# Patient Record
Sex: FEMALE | Race: WHITE | Employment: UNEMPLOYED | URBAN - METROPOLITAN AREA
[De-identification: names, ages, dates, MRNs, and addresses within clinical notes are randomized per-mention and may not be internally consistent; named-entity substitution may affect disease eponyms.]

---

## 2024-07-08 ENCOUNTER — TELEPHONE (OUTPATIENT)
Age: 29
End: 2024-07-08

## 2024-07-08 NOTE — TELEPHONE ENCOUNTER
Patients mother call once again and asked if Dr Reyes would still treat her for POTS. I advised her that she should find another provider outside of [facility] for her treatment

## 2024-07-24 ENCOUNTER — TELEPHONE (OUTPATIENT)
Age: 29
End: 2024-07-24

## 2024-07-30 ENCOUNTER — TELEPHONE (OUTPATIENT)
Age: 29
End: 2024-07-30

## 2024-07-30 NOTE — TELEPHONE ENCOUNTER
----- Message from Mandy Pinedo sent at 7/29/2024  6:59 PM EDT -----  Regarding: Ok Hydration  Contact: [phone]  I received a message from Virginie saying everything was sent where everything was suppose to go.   But pharmacy is telling me I need a PA for Amnvig and I think Naratriptan. They said they sent asking y’all to do this.  Can you please to that for me.  Thank you so much!  Mandy Pinedo

## 2024-07-31 NOTE — TELEPHONE ENCOUNTER
RE:Naratriptan HCl 2.5MG tablets   Key: [id number]         Submitted and returned with the following message:Available without authorization           RE:Aimovig  Key: [id number]           Request added and submitted via CMM, has been approved.        Approvedtoday  PA Case: [id number], Status: Approved, Coverage Starts on: 7/31/2024 12:00:00 AM, Coverage Ends on: 7/31/2025 12:00:00 AM.        Nurse notified

## 2024-07-31 NOTE — TELEPHONE ENCOUNTER
Patient and her mother were just informed 07/31/24  10 minutes ago by the pharmacy Dr. Reyes denied the PA for medication Aimovig.    Requesting to be contacted

## 2024-09-03 ENCOUNTER — OFFICE VISIT (OUTPATIENT)
Age: 29
End: 2024-09-03
Payer: MEDICAID

## 2024-09-03 VITALS
DIASTOLIC BLOOD PRESSURE: 82 MMHG | HEIGHT: 64 IN | SYSTOLIC BLOOD PRESSURE: 114 MMHG | HEART RATE: 123 BPM | OXYGEN SATURATION: 96 % | WEIGHT: 293 LBS | BODY MASS INDEX: 50.02 KG/M2 | RESPIRATION RATE: 20 BRPM

## 2024-09-03 DIAGNOSIS — E28.2 PCOS (POLYCYSTIC OVARIAN SYNDROME): ICD-10-CM

## 2024-09-03 DIAGNOSIS — E28.2 PCOS (POLYCYSTIC OVARIAN SYNDROME): Primary | ICD-10-CM

## 2024-09-03 PROCEDURE — G2211 COMPLEX E/M VISIT ADD ON: HCPCS | Performed by: INTERNAL MEDICINE

## 2024-09-03 PROCEDURE — 99214 OFFICE O/P EST MOD 30 MIN: CPT | Performed by: INTERNAL MEDICINE

## 2024-09-03 RX ORDER — METFORMIN HCL 500 MG
1000 TABLET, EXTENDED RELEASE 24 HR ORAL
Qty: 360 TABLET | Refills: 3 | Status: SHIPPED | OUTPATIENT
Start: 2024-09-03

## 2024-09-03 NOTE — PROGRESS NOTES
Chief Complaint   Patient presents with    Weight Management    Polycystic Ovarian Syndrome     History of Present Illness: Mandy Pinedo is a 28 y.o. female with a past medical history significant for POTS, Crohn's disease, PCOS seen for discussion related to PCOS.    Previously very intolerant to several different OCP due to heavy menses with this.  Is considering having a total abdominal hysterectomy due to inability to control frequency and heaviness of menses.  Has previously had 3 miscarriages, currently discussing options with OB/GYN.  Currently taking metformin 1000 mg twice a day.  Received a prior authorization for Wegovy from PCP but is unable to obtain the for several doses.  There is no history of pancreatitis or heavy alcohol use.  Primary concerns are weight loss, acne, hirsutism.  Cousin does have nonclassical CAH and they are wondering if this is related to her POTS.  17 hydroxyprogesterone was previously normal.    05/03/2024: Took samples for ozempic but insurance would not cover wegovy because she was not in comprehensive nutrition program. Stopped OCP as it caused emotional lability. Attempted to get pregnant more recently- had surgery on vaginal/uterine septum. May be able to have IUD due to this procedure. Fallopian tubes are patent according to pt.    09/03/2024: Had surgical manipulation of vaginal and uterine septum, did require IV antibiotics postoperatively.  Is experiencing significant back pain for which a steroid is planned in the coming days.  Both she and her mother are very suspicious about excess cortisol.  Does have dorsal cervical adiposity.  No recent exogenous steroids.  Is taking norethindrone and metformin 1000 twice a day.  Unable to get GLP-1 agonist covered.    Social Hx: Mother Jeannette    Review of Systems:  See HPI    Physical Examination:  /82   Pulse (!) 123   Resp 20   Ht 1.626 m (5' 4\")   Wt 133.2 kg (293 lb 9.6 oz)   LMP 06/04/2024   SpO2 96%   BMI

## 2024-09-05 ENCOUNTER — APPOINTMENT (OUTPATIENT)
Facility: HOSPITAL | Age: 29
End: 2024-09-05
Payer: MEDICAID

## 2024-09-05 ENCOUNTER — HOSPITAL ENCOUNTER (EMERGENCY)
Facility: HOSPITAL | Age: 29
Discharge: HOME | End: 2024-09-06
Attending: STUDENT IN AN ORGANIZED HEALTH CARE EDUCATION/TRAINING PROGRAM
Payer: MEDICAID

## 2024-09-05 ENCOUNTER — CLINICAL DOCUMENTATION (OUTPATIENT)
Age: 29
End: 2024-09-05

## 2024-09-05 ENCOUNTER — TELEPHONE (OUTPATIENT)
Age: 29
End: 2024-09-05

## 2024-09-05 DIAGNOSIS — N93.9 ABNORMAL UTERINE BLEEDING: Primary | ICD-10-CM

## 2024-09-05 LAB
ABO + RH BLD: NORMAL
ALBUMIN SERPL-MCNC: 3.2 G/DL (ref 3.5–5)
ALBUMIN/GLOB SERPL: 0.7 (ref 1.1–2.2)
ALP SERPL-CCNC: 70 U/L (ref 45–117)
ALT SERPL-CCNC: 35 U/L (ref 12–78)
ANION GAP SERPL CALC-SCNC: 3 MMOL/L (ref 2–12)
AST SERPL-CCNC: 15 U/L (ref 15–37)
BASOPHILS # BLD: 0.1 K/UL (ref 0–0.1)
BASOPHILS NFR BLD: 1 % (ref 0–1)
BILIRUB SERPL-MCNC: 0.2 MG/DL (ref 0.2–1)
BLOOD GROUP ANTIBODIES SERPL: NORMAL
BUN SERPL-MCNC: 12 MG/DL (ref 6–20)
BUN/CREAT SERPL: 12 (ref 12–20)
CALCIUM SERPL-MCNC: 8.8 MG/DL (ref 8.5–10.1)
CHLORIDE SERPL-SCNC: 108 MMOL/L (ref 97–108)
CO2 SERPL-SCNC: 25 MMOL/L (ref 21–32)
COMMENT:: NORMAL
CREAT SERPL-MCNC: 0.97 MG/DL (ref 0.55–1.02)
DIFFERENTIAL METHOD BLD: ABNORMAL
EOSINOPHIL # BLD: 0.7 K/UL (ref 0–0.4)
EOSINOPHIL NFR BLD: 6 % (ref 0–7)
ERYTHROCYTE [DISTWIDTH] IN BLOOD BY AUTOMATED COUNT: 14.7 % (ref 11.5–14.5)
GLOBULIN SER CALC-MCNC: 4.5 G/DL (ref 2–4)
GLUCOSE SERPL-MCNC: 103 MG/DL (ref 65–100)
HCT VFR BLD AUTO: 40.7 % (ref 35–47)
HGB BLD-MCNC: 12.9 G/DL (ref 11.5–16)
IMM GRANULOCYTES # BLD AUTO: 0.1 K/UL (ref 0–0.04)
IMM GRANULOCYTES NFR BLD AUTO: 1 % (ref 0–0.5)
LYMPHOCYTES # BLD: 4.3 K/UL (ref 0.8–3.5)
LYMPHOCYTES NFR BLD: 37 % (ref 12–49)
MCH RBC QN AUTO: 29 PG (ref 26–34)
MCHC RBC AUTO-ENTMCNC: 31.7 G/DL (ref 30–36.5)
MCV RBC AUTO: 91.5 FL (ref 80–99)
MONOCYTES # BLD: 1.2 K/UL (ref 0–1)
MONOCYTES NFR BLD: 10 % (ref 5–13)
NEUTS SEG # BLD: 5.2 K/UL (ref 1.8–8)
NEUTS SEG NFR BLD: 45 % (ref 32–75)
NRBC # BLD: 0 K/UL (ref 0–0.01)
NRBC BLD-RTO: 0 PER 100 WBC
PLATELET # BLD AUTO: 394 K/UL (ref 150–400)
PMV BLD AUTO: 10 FL (ref 8.9–12.9)
POTASSIUM SERPL-SCNC: 4 MMOL/L (ref 3.5–5.1)
PROT SERPL-MCNC: 7.7 G/DL (ref 6.4–8.2)
RBC # BLD AUTO: 4.45 M/UL (ref 3.8–5.2)
SODIUM SERPL-SCNC: 136 MMOL/L (ref 136–145)
SPECIMEN EXP DATE BLD: NORMAL
SPECIMEN HOLD: NORMAL
WBC # BLD AUTO: 11.6 K/UL (ref 3.6–11)

## 2024-09-05 PROCEDURE — 76830 TRANSVAGINAL US NON-OB: CPT

## 2024-09-05 PROCEDURE — 80053 COMPREHEN METABOLIC PANEL: CPT

## 2024-09-05 PROCEDURE — 86901 BLOOD TYPING SEROLOGIC RH(D): CPT

## 2024-09-05 PROCEDURE — 96374 THER/PROPH/DIAG INJ IV PUSH: CPT

## 2024-09-05 PROCEDURE — 86900 BLOOD TYPING SEROLOGIC ABO: CPT

## 2024-09-05 PROCEDURE — 85025 COMPLETE CBC W/AUTO DIFF WBC: CPT

## 2024-09-05 PROCEDURE — 6360000002 HC RX W HCPCS: Performed by: STUDENT IN AN ORGANIZED HEALTH CARE EDUCATION/TRAINING PROGRAM

## 2024-09-05 PROCEDURE — 99284 EMERGENCY DEPT VISIT MOD MDM: CPT

## 2024-09-05 PROCEDURE — 86850 RBC ANTIBODY SCREEN: CPT

## 2024-09-05 PROCEDURE — 36415 COLL VENOUS BLD VENIPUNCTURE: CPT

## 2024-09-05 RX ORDER — MORPHINE SULFATE 4 MG/ML
4 INJECTION, SOLUTION INTRAMUSCULAR; INTRAVENOUS
Status: COMPLETED | OUTPATIENT
Start: 2024-09-05 | End: 2024-09-05

## 2024-09-05 RX ADMIN — MORPHINE SULFATE 4 MG: 4 INJECTION, SOLUTION INTRAMUSCULAR; INTRAVENOUS at 23:28

## 2024-09-05 ASSESSMENT — PAIN DESCRIPTION - DESCRIPTORS
DESCRIPTORS: ACHING
DESCRIPTORS: SHARP

## 2024-09-05 ASSESSMENT — PAIN SCALES - GENERAL
PAINLEVEL_OUTOF10: 9
PAINLEVEL_OUTOF10: 9

## 2024-09-05 ASSESSMENT — PAIN - FUNCTIONAL ASSESSMENT: PAIN_FUNCTIONAL_ASSESSMENT: 0-10

## 2024-09-05 ASSESSMENT — PAIN DESCRIPTION - LOCATION
LOCATION: VAGINA
LOCATION: ABDOMEN

## 2024-09-05 NOTE — PROGRESS NOTES
Prior Authorization form completed and faxed to [facility] [employer] [facility]. Waiting for the determination.

## 2024-09-05 NOTE — TELEPHONE ENCOUNTER
Gris with [facility] called stating they received a prior auth with the name Mandy Pinedo but they have a different last name. Pharmacist asked me to contact the patient and get the correct last name. Called patient whom said her new last name is Gina but she is waiting for and updated insurance card. Informed Pharmacist of patient last name. Gris then asked what the diagnosis code is and I provided E66.01 and Z68.42. Gris thanked me for the information and provided the case reference number below.    Ref #: [id number]  Turn around time for the case is 22 hours and 54 minutes.

## 2024-09-06 ENCOUNTER — CLINICAL DOCUMENTATION (OUTPATIENT)
Age: 29
End: 2024-09-06

## 2024-09-06 ENCOUNTER — TELEPHONE (OUTPATIENT)
Age: 29
End: 2024-09-06

## 2024-09-06 VITALS
SYSTOLIC BLOOD PRESSURE: 122 MMHG | TEMPERATURE: 97.8 F | DIASTOLIC BLOOD PRESSURE: 70 MMHG | BODY MASS INDEX: 50.4 KG/M2 | HEIGHT: 64 IN | OXYGEN SATURATION: 97 % | RESPIRATION RATE: 18 BRPM | HEART RATE: 89 BPM

## 2024-09-06 LAB
APPEARANCE UR: CLEAR
BACTERIA URNS QL MICRO: NEGATIVE /HPF
BILIRUB UR QL: NEGATIVE
COLOR UR: ABNORMAL
EPITH CASTS URNS QL MICRO: ABNORMAL /LPF
GLUCOSE UR STRIP.AUTO-MCNC: NEGATIVE MG/DL
HGB UR QL STRIP: NEGATIVE
HYALINE CASTS URNS QL MICRO: ABNORMAL /LPF (ref 0–2)
KETONES UR QL STRIP.AUTO: NEGATIVE MG/DL
LEUKOCYTE ESTERASE UR QL STRIP.AUTO: NEGATIVE
NITRITE UR QL STRIP.AUTO: NEGATIVE
PH UR STRIP: 7 (ref 5–8)
PROT UR STRIP-MCNC: NEGATIVE MG/DL
RBC #/AREA URNS HPF: ABNORMAL /HPF (ref 0–5)
SP GR UR REFRACTOMETRY: 1.01 (ref 1–1.03)
UROBILINOGEN UR QL STRIP.AUTO: 0.2 EU/DL (ref 0.2–1)
WBC URNS QL MICRO: ABNORMAL /HPF (ref 0–4)

## 2024-09-06 PROCEDURE — 81001 URINALYSIS AUTO W/SCOPE: CPT

## 2024-09-06 NOTE — ED TRIAGE NOTES
Patient to treatment area via wheelchair reporting passing a palm sized blood clot following uterine biopsy done yesterday done by MD Sanchez at [facility]. Pt had uterine/ vaginal septum done in January, had extenive post-op infection which resolved 2 weeks ago. Pt called on-call OB/GYN and was referred here.

## 2024-09-06 NOTE — ED NOTES
Pt unable to provide urine at this time.    PT instructed to call out whenever she is ready to go to the bathroom

## 2024-09-06 NOTE — TELEPHONE ENCOUNTER
Pt returned the call to the office and verified using 2 patient identifiers. Pt was made aware of the information below regarding the denial of the medication and will follow up with Dr. Taylor next week.    Pt voiced understandings and will wait to hear back from the office next week.

## 2024-09-06 NOTE — TELEPHONE ENCOUNTER
Received a denial letter for the Wegovy from [employer]. The letter states \"maybe able to approve the medication in a  certain situation where the patient had tried and failed a certain other drug for 6 month trial of Saxenda without a body weight reduction of 5%) but they did not see that information applied.        Will notify the patient and make Dr. Taylor aware of this information upon returning the office next week to see how she which to proceed.       Pt called but went to voicemail. Left message advising to call the office back.

## 2024-09-06 NOTE — ED PROVIDER NOTES
[facility] EMERGENCY DEPT  EMERGENCY DEPARTMENT ENCOUNTER      Pt Name: Mandy Fuentes  MRN: [id number]  Birthdate 1995  Date of evaluation: 9/5/2024  Provider: Syed Arteaga MD    CHIEF COMPLAINT       Chief Complaint   Patient presents with    Vaginal Bleeding    Post-op Problem         HISTORY OF PRESENT ILLNESS   (Location/Symptom, Timing/Onset, Context/Setting, Quality, Duration, Modifying Factors, Severity)  Note limiting factors.   Patient is a 28-year-old female presenting emergency department with pelvic pain, passing clots and vaginal bleeding.  Patient has a recent complicated medical course where she had a uterine septum resection admitted for pelvic pain not responsive to 3 days of outpatient antibiotics for PID. She was treated with 24 hours of IV antibiotics with improvement in her symptoms and discharged with oral antibiotics. She has GYN follow-up scheduled with [facility] [place].  Yesterday [facility] she started to have lower pelvic pain, discomfort started passing large clots.  Patient shows picture of a clot approximately the size of a softball she has been having chills denies any chest pain, shortness of breath dizziness, lightheadedness.            Review of External Medical Records:     Nursing Notes were reviewed.    REVIEW OF SYSTEMS    (2-9 systems for level 4, 10 or more for level 5)     Review of Systems    Except as noted above the remainder of the review of systems was reviewed and negative.       PAST MEDICAL HISTORY     Past Medical History:   Diagnosis Date    Abnormal Pap smear of cervix     Adiposity 12/29/2017    Adverse effect of anesthesia     woke up during colonoscopy    Adverse effect of anesthesia     difficult IV stick    Allergic rhinitis due to other allergen     Amplified musculoskeletal pain syndrome     RND    Anxiety     Arthritis     spondylar arthritis    Asthma     seasonal    Chronic pain   motion.      Cervical back: Normal range of motion and neck supple.   Skin:     General: Skin is warm and dry.   Neurological:      General: No focal deficit present.      Mental Status: She is alert and oriented to person, place, and time. Mental status is at baseline.   Psychiatric:         Mood and Affect: Mood normal.         Behavior: Behavior normal.         DIAGNOSTIC RESULTS     EKG: All EKG's are interpreted by the Emergency Department Physician who either signs or Co-signs this chart in the absence of a cardiologist.        RADIOLOGY:   Non-plain film images such as CT, Ultrasound and MRI are read by the radiologist. Plain radiographic images are visualized and preliminarily interpreted by the emergency physician with the below findings:        Interpretation per the Radiologist below, if available at the time of this note:    US NON OB TRANSVAGINAL   Final Result   Normal appearance of the uterus and left ovary. Right ovary obscured   by bowel gas.      Electronically signed by Santosh Block           LABS:  Labs Reviewed   URINALYSIS WITH MICROSCOPIC - Abnormal; Notable for the following components:       Result Value    Epithelial Cells, UA MODERATE (*)     All other components within normal limits   CBC WITH AUTO DIFFERENTIAL - Abnormal; Notable for the following components:    WBC 11.6 (*)     RDW 14.7 (*)     Immature Granulocytes % 1 (*)     Lymphocytes Absolute 4.3 (*)     Monocytes Absolute 1.2 (*)     Eosinophils Absolute 0.7 (*)     Immature Granulocytes Absolute 0.1 (*)     All other components within normal limits   COMPREHENSIVE METABOLIC PANEL - Abnormal; Notable for the following components:    Glucose 103 (*)     Albumin 3.2 (*)     Globulin 4.5 (*)     Albumin/Globulin Ratio 0.7 (*)     All other components within normal limits   EXTRA TUBES HOLD   EXTRA TUBE, BLOOD BANK   TYPE AND SCREEN       All other labs were within normal range or not returned as of this dictation.    EMERGENCY

## 2024-09-06 NOTE — PROGRESS NOTES
Received a denial letter for the Wegovy from [employer]. The letter states \"maybe able to approve the medication in a  certain situation where the patient had tried and failed a certain other drug for 6 month trial of Saxenda without a body weight reduction of 5%) but they did not see that information applied.      Will notify the patient and make Dr. Taylor aware of this information upon returning the office next week to see how she which to proceed.

## 2024-09-06 NOTE — ED NOTES
ED SIGN OUT NOTE  Care assumed at [facility] 1:21 AM EDT    Patient was signed out to me by Dr. Arteaga.     Patient is awaiting lab and ultrasound results and reassessment..    /70   Pulse (!) 105   Temp 97.8 °F (36.6 °C) (Oral)   Resp 18   Ht 1.626 m (5' 4\")   LMP 06/04/2024   SpO2 97%   BMI 50.40 kg/m²     Labs Reviewed   URINALYSIS WITH MICROSCOPIC - Abnormal; Notable for the following components:       Result Value    Epithelial Cells, UA MODERATE (*)     All other components within normal limits   CBC WITH AUTO DIFFERENTIAL - Abnormal; Notable for the following components:    WBC 11.6 (*)     RDW 14.7 (*)     Immature Granulocytes % 1 (*)     Lymphocytes Absolute 4.3 (*)     Monocytes Absolute 1.2 (*)     Eosinophils Absolute 0.7 (*)     Immature Granulocytes Absolute 0.1 (*)     All other components within normal limits   COMPREHENSIVE METABOLIC PANEL - Abnormal; Notable for the following components:    Glucose 103 (*)     Albumin 3.2 (*)     Globulin 4.5 (*)     Albumin/Globulin Ratio 0.7 (*)     All other components within normal limits   EXTRA TUBES HOLD   EXTRA TUBE, BLOOD BANK   TYPE AND SCREEN     US NON OB TRANSVAGINAL   Final Result   Normal appearance of the uterus and left ovary. Right ovary obscured   by bowel gas.      Electronically signed by Santosh Block           This is a 28-year-old female patient to the ER for evaluation for vaginal bleeding who is otherwise stable.  The patient had a biopsy of the uterus yesterday by her gynecologist and began bleeding this evening when she had approximately 1 large clot.  She denies any urinary discomfort at this time.   EXAM:  External genitalia normal.  Pelvic exam: cervix normal, ovaries and uterus normal size and cervical motion tenderness without any rebound or guarding, no adnexal masses.  Yellowish discharge.      Progress Note:   Pt has been reexamined by Jerry Cisneros MD. Pt is feeling much better. Symptoms have  improved. All available results have been reviewed with pt and any available family. Pt understands sx, dx, and tx in ED. Care plan has been outlined and questions have been answered. Pt is ready to go home. Will send home on abnormal uterine bleeding instruction.. Outpatient referral with gynecologist this morning for evaluation and further treatment as needed. Written by Jerry Cisneros MD,1:23 AM     Diagnosis:   1. Abnormal uterine bleeding        Disposition:   Decision To Discharge 09/06/2024 12:43:19 AM    Plan:   Discharged home with outpatient referral to gynecologist this morning for reevaluation and further treatment    MD Blayne Duckworth William N, MD  09/06/24 0124

## 2024-09-08 DIAGNOSIS — E66.01 CLASS 3 SEVERE OBESITY DUE TO EXCESS CALORIES WITH SERIOUS COMORBIDITY AND BODY MASS INDEX (BMI) OF 45.0 TO 49.9 IN ADULT (HCC): Primary | ICD-10-CM

## 2024-09-08 RX ORDER — LIRAGLUTIDE 6 MG/ML
INJECTION, SOLUTION SUBCUTANEOUS
Qty: 5 ADJUSTABLE DOSE PRE-FILLED PEN SYRINGE | Refills: 2 | Status: SHIPPED | OUTPATIENT
Start: 2024-09-08

## 2024-09-10 ENCOUNTER — TELEPHONE (OUTPATIENT)
Age: 29
End: 2024-09-10

## 2024-09-11 ENCOUNTER — CLINICAL DOCUMENTATION (OUTPATIENT)
Age: 29
End: 2024-09-11

## 2024-09-12 ENCOUNTER — HOSPITAL ENCOUNTER (OUTPATIENT)
Facility: HOSPITAL | Age: 29
Discharge: HOME | End: 2024-09-12
Attending: PEDIATRICS | Admitting: STUDENT IN AN ORGANIZED HEALTH CARE EDUCATION/TRAINING PROGRAM
Payer: MEDICAID

## 2024-09-12 ENCOUNTER — TELEPHONE (OUTPATIENT)
Age: 29
End: 2024-09-12

## 2024-09-12 VITALS
OXYGEN SATURATION: 99 % | TEMPERATURE: 98 F | SYSTOLIC BLOOD PRESSURE: 148 MMHG | DIASTOLIC BLOOD PRESSURE: 100 MMHG | WEIGHT: 282 LBS | HEIGHT: 64 IN | BODY MASS INDEX: 48.14 KG/M2 | HEART RATE: 101 BPM

## 2024-09-12 DIAGNOSIS — M45.0 ANKYLOSING SPONDYLITIS OF MULTIPLE SITES IN SPINE (HCC): ICD-10-CM

## 2024-09-12 PROCEDURE — 6360000004 HC RX CONTRAST MEDICATION: Performed by: STUDENT IN AN ORGANIZED HEALTH CARE EDUCATION/TRAINING PROGRAM

## 2024-09-12 PROCEDURE — 27096 INJECT SACROILIAC JOINT: CPT

## 2024-09-12 PROCEDURE — 6360000002 HC RX W HCPCS: Performed by: STUDENT IN AN ORGANIZED HEALTH CARE EDUCATION/TRAINING PROGRAM

## 2024-09-12 PROCEDURE — 81025 URINE PREGNANCY TEST: CPT

## 2024-09-12 PROCEDURE — 2500000003 HC RX 250 WO HCPCS: Performed by: STUDENT IN AN ORGANIZED HEALTH CARE EDUCATION/TRAINING PROGRAM

## 2024-09-12 RX ORDER — TRIAMCINOLONE ACETONIDE 40 MG/ML
INJECTION, SUSPENSION INTRA-ARTICULAR; INTRAMUSCULAR PRN
Status: DISCONTINUED | OUTPATIENT
Start: 2024-09-12 | End: 2024-09-12 | Stop reason: HOSPADM

## 2024-09-12 RX ORDER — LIDOCAINE HYDROCHLORIDE 10 MG/ML
INJECTION, SOLUTION EPIDURAL; INFILTRATION; INTRACAUDAL; PERINEURAL PRN
Status: DISCONTINUED | OUTPATIENT
Start: 2024-09-12 | End: 2024-09-12 | Stop reason: HOSPADM

## 2024-09-12 RX ORDER — BUPIVACAINE HYDROCHLORIDE 5 MG/ML
INJECTION, SOLUTION EPIDURAL; INTRACAUDAL PRN
Status: DISCONTINUED | OUTPATIENT
Start: 2024-09-12 | End: 2024-09-12 | Stop reason: HOSPADM

## 2024-09-12 RX ADMIN — BUPIVACAINE HYDROCHLORIDE 4 ML: 5 INJECTION, SOLUTION EPIDURAL; INTRACAUDAL; PERINEURAL at 12:35

## 2024-09-12 RX ADMIN — LIDOCAINE HYDROCHLORIDE 10 ML: 10 INJECTION, SOLUTION EPIDURAL; INFILTRATION; INTRACAUDAL; PERINEURAL at 12:33

## 2024-09-12 RX ADMIN — TRIAMCINOLONE ACETONIDE 80 MG: 40 INJECTION, SUSPENSION INTRA-ARTICULAR; INTRAMUSCULAR at 12:35

## 2024-09-12 RX ADMIN — IOHEXOL 5 ML: 180 INJECTION INTRAVENOUS at 12:34

## 2024-09-12 ASSESSMENT — PAIN DESCRIPTION - LOCATION: LOCATION: BACK

## 2024-09-12 ASSESSMENT — PAIN SCALES - GENERAL: PAINLEVEL_OUTOF10: 8

## 2024-09-12 ASSESSMENT — PAIN DESCRIPTION - DESCRIPTORS: DESCRIPTORS: SHARP

## 2024-09-17 LAB
CORTIS F 24H UR-MCNC: 2 UG/L
CORTIS F 24H UR-MRATE: 12 UG/24 HR (ref 6–42)

## 2024-09-18 ENCOUNTER — APPOINTMENT (OUTPATIENT)
Facility: HOSPITAL | Age: 29
End: 2024-09-18
Payer: MEDICAID

## 2024-09-18 ENCOUNTER — HOSPITAL ENCOUNTER (EMERGENCY)
Facility: HOSPITAL | Age: 29
Discharge: HOME | End: 2024-09-18
Attending: EMERGENCY MEDICINE
Payer: MEDICAID

## 2024-09-18 ENCOUNTER — TELEPHONE (OUTPATIENT)
Age: 29
End: 2024-09-18

## 2024-09-18 VITALS
RESPIRATION RATE: 18 BRPM | BODY MASS INDEX: 49.64 KG/M2 | OXYGEN SATURATION: 98 % | SYSTOLIC BLOOD PRESSURE: 136 MMHG | HEART RATE: 92 BPM | TEMPERATURE: 98 F | WEIGHT: 290.79 LBS | HEIGHT: 64 IN | DIASTOLIC BLOOD PRESSURE: 94 MMHG

## 2024-09-18 DIAGNOSIS — R23.2 FLUSHING: Primary | ICD-10-CM

## 2024-09-18 DIAGNOSIS — R60.0 PEDAL EDEMA: ICD-10-CM

## 2024-09-18 DIAGNOSIS — R50.9 FEVER, UNSPECIFIED FEVER CAUSE: ICD-10-CM

## 2024-09-18 LAB
ALBUMIN SERPL-MCNC: 3.6 G/DL (ref 3.5–5)
ALBUMIN/GLOB SERPL: 0.7 (ref 1.1–2.2)
ALP SERPL-CCNC: 74 U/L (ref 45–117)
ALT SERPL-CCNC: 109 U/L (ref 12–78)
ANION GAP SERPL CALC-SCNC: 9 MMOL/L (ref 2–12)
APPEARANCE UR: CLEAR
AST SERPL-CCNC: 41 U/L (ref 15–37)
BACTERIA URNS QL MICRO: NEGATIVE /HPF
BASOPHILS # BLD: 0.1 K/UL (ref 0–0.1)
BASOPHILS NFR BLD: 1 % (ref 0–1)
BILIRUB SERPL-MCNC: 0.2 MG/DL (ref 0.2–1)
BILIRUB UR QL: NEGATIVE
BUN SERPL-MCNC: 13 MG/DL (ref 6–20)
BUN/CREAT SERPL: 14 (ref 12–20)
CALCIUM SERPL-MCNC: 10.2 MG/DL (ref 8.5–10.1)
CHLORIDE SERPL-SCNC: 102 MMOL/L (ref 97–108)
CO2 SERPL-SCNC: 27 MMOL/L (ref 21–32)
COLOR UR: NORMAL
COMMENT:: NORMAL
CREAT SERPL-MCNC: 0.94 MG/DL (ref 0.55–1.02)
CRP SERPL-MCNC: 1.37 MG/DL (ref 0–0.3)
DIFFERENTIAL METHOD BLD: ABNORMAL
EOSINOPHIL # BLD: 0.2 K/UL (ref 0–0.4)
EOSINOPHIL NFR BLD: 2 % (ref 0–7)
EPITH CASTS URNS QL MICRO: NORMAL /LPF
ERYTHROCYTE [DISTWIDTH] IN BLOOD BY AUTOMATED COUNT: 15.1 % (ref 11.5–14.5)
ERYTHROCYTE [SEDIMENTATION RATE] IN BLOOD: 56 MM/HR (ref 0–20)
GLOBULIN SER CALC-MCNC: 4.9 G/DL (ref 2–4)
GLUCOSE SERPL-MCNC: 88 MG/DL (ref 65–100)
GLUCOSE UR STRIP.AUTO-MCNC: NEGATIVE MG/DL
HCG UR QL: NEGATIVE
HCT VFR BLD AUTO: 41.5 % (ref 35–47)
HGB BLD-MCNC: 13.6 G/DL (ref 11.5–16)
HGB UR QL STRIP: NEGATIVE
IMM GRANULOCYTES # BLD AUTO: 0.2 K/UL (ref 0–0.04)
IMM GRANULOCYTES NFR BLD AUTO: 1 % (ref 0–0.5)
KETONES UR QL STRIP.AUTO: NEGATIVE MG/DL
LEUKOCYTE ESTERASE UR QL STRIP.AUTO: NEGATIVE
LYMPHOCYTES # BLD: 4.8 K/UL (ref 0.8–3.5)
LYMPHOCYTES NFR BLD: 34 % (ref 12–49)
MCH RBC QN AUTO: 28.8 PG (ref 26–34)
MCHC RBC AUTO-ENTMCNC: 32.8 G/DL (ref 30–36.5)
MCV RBC AUTO: 87.7 FL (ref 80–99)
MONOCYTES # BLD: 1.2 K/UL (ref 0–1)
MONOCYTES NFR BLD: 9 % (ref 5–13)
NEUTS SEG # BLD: 7.6 K/UL (ref 1.8–8)
NEUTS SEG NFR BLD: 54 % (ref 32–75)
NITRITE UR QL STRIP.AUTO: NEGATIVE
NRBC # BLD: 0 K/UL (ref 0–0.01)
NRBC BLD-RTO: 0 PER 100 WBC
PH UR STRIP: 6.5 (ref 5–8)
PLATELET # BLD AUTO: 429 K/UL (ref 150–400)
PMV BLD AUTO: 10.4 FL (ref 8.9–12.9)
POTASSIUM SERPL-SCNC: 4.5 MMOL/L (ref 3.5–5.1)
PROT SERPL-MCNC: 8.5 G/DL (ref 6.4–8.2)
PROT UR STRIP-MCNC: NEGATIVE MG/DL
RBC # BLD AUTO: 4.73 M/UL (ref 3.8–5.2)
RBC #/AREA URNS HPF: NORMAL /HPF (ref 0–5)
SODIUM SERPL-SCNC: 138 MMOL/L (ref 136–145)
SP GR UR REFRACTOMETRY: <1.005 (ref 1–1.03)
SPECIMEN HOLD: NORMAL
UROBILINOGEN UR QL STRIP.AUTO: 0.2 EU/DL (ref 0.2–1)
WBC # BLD AUTO: 14.1 K/UL (ref 3.6–11)
WBC URNS QL MICRO: NORMAL /HPF (ref 0–4)

## 2024-09-18 PROCEDURE — 81001 URINALYSIS AUTO W/SCOPE: CPT

## 2024-09-18 PROCEDURE — 80053 COMPREHEN METABOLIC PANEL: CPT

## 2024-09-18 PROCEDURE — 6360000004 HC RX CONTRAST MEDICATION: Performed by: EMERGENCY MEDICINE

## 2024-09-18 PROCEDURE — 96374 THER/PROPH/DIAG INJ IV PUSH: CPT

## 2024-09-18 PROCEDURE — 2580000003 HC RX 258: Performed by: EMERGENCY MEDICINE

## 2024-09-18 PROCEDURE — 99285 EMERGENCY DEPT VISIT HI MDM: CPT

## 2024-09-18 PROCEDURE — 6360000002 HC RX W HCPCS: Performed by: EMERGENCY MEDICINE

## 2024-09-18 PROCEDURE — 85025 COMPLETE CBC W/AUTO DIFF WBC: CPT

## 2024-09-18 PROCEDURE — 85652 RBC SED RATE AUTOMATED: CPT

## 2024-09-18 PROCEDURE — 36415 COLL VENOUS BLD VENIPUNCTURE: CPT

## 2024-09-18 PROCEDURE — 86140 C-REACTIVE PROTEIN: CPT

## 2024-09-18 PROCEDURE — 72193 CT PELVIS W/DYE: CPT

## 2024-09-18 RX ORDER — IOPAMIDOL 755 MG/ML
100 INJECTION, SOLUTION INTRAVASCULAR
Status: COMPLETED | OUTPATIENT
Start: 2024-09-18 | End: 2024-09-18

## 2024-09-18 RX ORDER — 0.9 % SODIUM CHLORIDE 0.9 %
1000 INTRAVENOUS SOLUTION INTRAVENOUS ONCE
Status: COMPLETED | OUTPATIENT
Start: 2024-09-18 | End: 2024-09-18

## 2024-09-18 RX ORDER — TRAMADOL HYDROCHLORIDE 50 MG/1
50 TABLET ORAL EVERY 6 HOURS PRN
COMMUNITY

## 2024-09-18 RX ORDER — KETOROLAC TROMETHAMINE 30 MG/ML
15 INJECTION, SOLUTION INTRAMUSCULAR; INTRAVENOUS ONCE
Status: COMPLETED | OUTPATIENT
Start: 2024-09-18 | End: 2024-09-18

## 2024-09-18 RX ADMIN — IOPAMIDOL 100 ML: 755 INJECTION, SOLUTION INTRAVENOUS at 12:54

## 2024-09-18 RX ADMIN — SODIUM CHLORIDE 1000 ML: 9 INJECTION, SOLUTION INTRAVENOUS at 12:15

## 2024-09-18 RX ADMIN — KETOROLAC TROMETHAMINE 15 MG: 30 INJECTION, SOLUTION INTRAMUSCULAR at 12:39

## 2024-09-18 ASSESSMENT — PAIN DESCRIPTION - ORIENTATION: ORIENTATION: OTHER (COMMENT)

## 2024-09-18 ASSESSMENT — PAIN DESCRIPTION - DESCRIPTORS: DESCRIPTORS: DULL;ACHING

## 2024-09-18 ASSESSMENT — PAIN DESCRIPTION - LOCATION: LOCATION: OTHER (COMMENT)

## 2024-09-18 ASSESSMENT — PAIN SCALES - GENERAL
PAINLEVEL_OUTOF10: 8
PAINLEVEL_OUTOF10: 9
PAINLEVEL_OUTOF10: 9

## 2024-09-18 ASSESSMENT — LIFESTYLE VARIABLES
HOW MANY STANDARD DRINKS CONTAINING ALCOHOL DO YOU HAVE ON A TYPICAL DAY: PATIENT DOES NOT DRINK
HOW OFTEN DO YOU HAVE A DRINK CONTAINING ALCOHOL: NEVER

## 2024-09-21 LAB
CORTIS BS SAL-MCNC: <0.01 UG/DL
CORTIS SP1 P CHAL SAL-MCNC: <0.01 UG/DL
SPECIMEN STATUS REPORT: NORMAL

## 2024-09-24 ENCOUNTER — TELEPHONE (OUTPATIENT)
Age: 29
End: 2024-09-24

## 2024-09-24 NOTE — TELEPHONE ENCOUNTER
Per pt, due to her having excessive thirst and frequent urination, she was asked to have Dr Solis to test her for diabetes insipidus. Pt was made aware that Dr Solis is out of the office and  will not return until 10/7 but I would forward this message for her provider to view when she returns.      She then stated that for the past 7 days,  it appears that her breast are leaking milk and she is not pregnant nor has she been sexually active.      Please advise on what the pt should do or if labs are needed.

## 2024-10-09 RX ORDER — GOLIMUMAB 50 MG/.5ML
INJECTION, SOLUTION SUBCUTANEOUS
Qty: 0.5 ML | Refills: 3 | Status: ACTIVE | OUTPATIENT
Start: 2024-10-09

## 2024-10-09 RX ORDER — CYCLOBENZAPRINE HCL 5 MG
5 TABLET ORAL 2 TIMES DAILY PRN
Qty: 60 TABLET | Refills: 1 | Status: SHIPPED | OUTPATIENT
Start: 2024-10-09 | End: 2024-10-10

## 2024-10-09 NOTE — TELEPHONE ENCOUNTER
Last visit 07/08/24  Lab Results   Component Value Date     09/18/2024    K 4.5 09/18/2024     09/18/2024    CO2 27 09/18/2024    BUN 13 09/18/2024    CREATININE 0.94 09/18/2024    GLUCOSE 88 09/18/2024    CALCIUM 10.2 (H) 09/18/2024    BILITOT 0.2 09/18/2024    ALKPHOS 74 09/18/2024    AST 41 (H) 09/18/2024     (H) 09/18/2024    LABGLOM 86 09/18/2024    GFRAA >60 09/20/2022    AGRATIO 1.4 10/17/2023    GLOB 4.9 (H) 09/18/2024     Lab Results   Component Value Date    WBC 14.1 (H) 09/18/2024    HGB 13.6 09/18/2024    HCT 41.5 09/18/2024    MCV 87.7 09/18/2024     (H) 09/18/2024

## 2024-10-10 ENCOUNTER — OFFICE VISIT (OUTPATIENT)
Age: 29
End: 2024-10-10
Payer: MEDICAID

## 2024-10-10 VITALS
DIASTOLIC BLOOD PRESSURE: 82 MMHG | WEIGHT: 293 LBS | HEIGHT: 65 IN | OXYGEN SATURATION: 97 % | SYSTOLIC BLOOD PRESSURE: 116 MMHG | HEART RATE: 123 BPM | RESPIRATION RATE: 16 BRPM | BODY MASS INDEX: 48.82 KG/M2 | TEMPERATURE: 97.2 F

## 2024-10-10 DIAGNOSIS — G93.2 IIH (IDIOPATHIC INTRACRANIAL HYPERTENSION): ICD-10-CM

## 2024-10-10 DIAGNOSIS — K58.0 IRRITABLE BOWEL SYNDROME WITH DIARRHEA: ICD-10-CM

## 2024-10-10 DIAGNOSIS — Z76.89 ENCOUNTER TO ESTABLISH CARE: Primary | ICD-10-CM

## 2024-10-10 DIAGNOSIS — K21.9 GASTROESOPHAGEAL REFLUX DISEASE WITHOUT ESOPHAGITIS: ICD-10-CM

## 2024-10-10 DIAGNOSIS — K50.119 CROHN'S DISEASE OF COLON WITH COMPLICATION (HCC): ICD-10-CM

## 2024-10-10 DIAGNOSIS — F90.2 ATTENTION DEFICIT HYPERACTIVITY DISORDER (ADHD), COMBINED TYPE: ICD-10-CM

## 2024-10-10 DIAGNOSIS — G90.A POTS (POSTURAL ORTHOSTATIC TACHYCARDIA SYNDROME): ICD-10-CM

## 2024-10-10 DIAGNOSIS — F43.10 PTSD (POST-TRAUMATIC STRESS DISORDER): ICD-10-CM

## 2024-10-10 DIAGNOSIS — F33.41 RECURRENT MAJOR DEPRESSIVE DISORDER, IN PARTIAL REMISSION (HCC): ICD-10-CM

## 2024-10-10 DIAGNOSIS — F41.9 ANXIETY: ICD-10-CM

## 2024-10-10 DIAGNOSIS — R79.89 ABNORMAL LFTS (LIVER FUNCTION TESTS): ICD-10-CM

## 2024-10-10 DIAGNOSIS — L73.9 FOLLICULITIS: ICD-10-CM

## 2024-10-10 DIAGNOSIS — Z23 NEED FOR INFLUENZA VACCINATION: ICD-10-CM

## 2024-10-10 DIAGNOSIS — H61.20 CERUMEN IN AUDITORY CANAL ON EXAMINATION: ICD-10-CM

## 2024-10-10 PROBLEM — K44.9 HIATAL HERNIA: Status: ACTIVE | Noted: 2024-10-10

## 2024-10-10 PROBLEM — L40.9 PSORIASIS: Status: ACTIVE | Noted: 2024-10-10

## 2024-10-10 PROBLEM — F44.5 PSYCHOGENIC NONEPILEPTIC SEIZURE: Status: ACTIVE | Noted: 2024-10-10

## 2024-10-10 PROCEDURE — 90661 CCIIV3 VAC ABX FR 0.5 ML IM: CPT

## 2024-10-10 PROCEDURE — 99214 OFFICE O/P EST MOD 30 MIN: CPT

## 2024-10-10 RX ORDER — GOLIMUMAB 50 MG/.5ML
50 INJECTION, SOLUTION SUBCUTANEOUS
COMMUNITY

## 2024-10-10 RX ORDER — VENLAFAXINE HYDROCHLORIDE 75 MG/1
75 CAPSULE, EXTENDED RELEASE ORAL DAILY
COMMUNITY

## 2024-10-10 RX ORDER — MONTELUKAST SODIUM 4 MG/1
1 TABLET, CHEWABLE ORAL PRN
COMMUNITY

## 2024-10-10 ASSESSMENT — PATIENT HEALTH QUESTIONNAIRE - PHQ9
4. FEELING TIRED OR HAVING LITTLE ENERGY: NEARLY EVERY DAY
1. LITTLE INTEREST OR PLEASURE IN DOING THINGS: NEARLY EVERY DAY
9. THOUGHTS THAT YOU WOULD BE BETTER OFF DEAD, OR OF HURTING YOURSELF: NOT AT ALL
SUM OF ALL RESPONSES TO PHQ QUESTIONS 1-9: 18
SUM OF ALL RESPONSES TO PHQ9 QUESTIONS 1 & 2: 5
7. TROUBLE CONCENTRATING ON THINGS, SUCH AS READING THE NEWSPAPER OR WATCHING TELEVISION: MORE THAN HALF THE DAYS
2. FEELING DOWN, DEPRESSED OR HOPELESS: MORE THAN HALF THE DAYS
SUM OF ALL RESPONSES TO PHQ QUESTIONS 1-9: 18
SUM OF ALL RESPONSES TO PHQ QUESTIONS 1-9: 18
6. FEELING BAD ABOUT YOURSELF - OR THAT YOU ARE A FAILURE OR HAVE LET YOURSELF OR YOUR FAMILY DOWN: NEARLY EVERY DAY
SUM OF ALL RESPONSES TO PHQ QUESTIONS 1-9: 18
8. MOVING OR SPEAKING SO SLOWLY THAT OTHER PEOPLE COULD HAVE NOTICED. OR THE OPPOSITE, BEING SO FIGETY OR RESTLESS THAT YOU HAVE BEEN MOVING AROUND A LOT MORE THAN USUAL: NEARLY EVERY DAY
10. IF YOU CHECKED OFF ANY PROBLEMS, HOW DIFFICULT HAVE THESE PROBLEMS MADE IT FOR YOU TO DO YOUR WORK, TAKE CARE OF THINGS AT HOME, OR GET ALONG WITH OTHER PEOPLE: EXTREMELY DIFFICULT
5. POOR APPETITE OR OVEREATING: NOT AT ALL
3. TROUBLE FALLING OR STAYING ASLEEP: MORE THAN HALF THE DAYS

## 2024-10-10 ASSESSMENT — ENCOUNTER SYMPTOMS
WHEEZING: 0
CONSTIPATION: 0
BACK PAIN: 1
SHORTNESS OF BREATH: 0
SINUS PAIN: 0
ABDOMINAL PAIN: 0
DIARRHEA: 0
SORE THROAT: 0
VOMITING: 0
CHEST TIGHTNESS: 0
NAUSEA: 0
COUGH: 0

## 2024-10-10 NOTE — PROGRESS NOTES
specialist- no acute findings meriting change in the plan         Folliculitis    Reassured that vulvar lesion appears to be secondary to folliculitis. Recommended monitoring for signs of superimposed infection.         Cerumen in auditory canal on examination    Likely explanation for reported eustachian tube dysfunction, recommended Debrox.           Subjective/Objective:   Mandy was seen today for Establish Care (Patient here today as a new patient to establish care.Pt concerned that her ears are stopped up and popping x 2 weeks. Patient has a boil that popped yesterday on her bottom that she would like to get checked. Gogo Ayala [facility] )     Mandy is a 28 year-old woman with a medical history of POTS, Crohn's, PCOS, ankylosing spondylitis, psoriasis c/b psoriatic arthritis, hiatal hernia c/b GERD s/p Nissen fundoplication, IBS-D, migraines, suspected IIH, pseudoseizures, CASE/MDD/PTSD/ADHD, ?somatic symptom disroder (self-reported), and chronic pain related to AS and/or thoracic DJD (follows with multiple specialists including Endo, Cardiology, Neurology, Neuro-Ophtho, Rheumatology, Weight management, Psychiatry, and Neuropsych) who presents to establish care.    Providers:  Cardiology - Dr. Marybeth Plasencia ([facility]) and Dr. Kirt Greene ([facility])  GI - Dr. Claire Wilkerson ([facility])  Rheum - Dr. Jaimee Snider  Endo: Dr. Corina Solis  Neurology: Previously Dr. Delbert Reyes, now Dr. Monroe [facility]  Neuro-Ophtho: Dr. Satish Mcadams ([facility]  Weight Management: Dr. Thuy Taylor  Ob/Gyn: Dr. Ruben Ayala  Psychiatry: Dr. Fletcher Dubois [facility] at [facility]    POTS:   More recent diagnosis within the last couple years. Manifests as frequent lightheadedness and spells of passing out with ambulation, getting up and dressed. Follows with two Cardiologists, Dr. Marybeth Plasencia and Dr. Kirt Greene. Per patient, Dr. Plasencia plans to restart her on IV hydration therapy which she used

## 2024-10-10 NOTE — ASSESSMENT & PLAN NOTE
Monitored by specialist- no acute findings meriting change in the plan. Reports pending LP with Neuro-Ophtho.

## 2024-10-14 ENCOUNTER — TELEPHONE (OUTPATIENT)
Age: 29
End: 2024-10-14

## 2024-10-14 NOTE — TELEPHONE ENCOUNTER
Identified patient with 2 pt identifiers.    Mother called to cancel appt, stated daughter was not feeling well     Will call to reschedule

## 2024-10-15 ENCOUNTER — TELEMEDICINE (OUTPATIENT)
Age: 29
End: 2024-10-15
Payer: MEDICAID

## 2024-10-15 DIAGNOSIS — N64.3 GALACTORRHEA: ICD-10-CM

## 2024-10-15 DIAGNOSIS — N64.3 GALACTORRHEA: Primary | ICD-10-CM

## 2024-10-15 PROCEDURE — G2211 COMPLEX E/M VISIT ADD ON: HCPCS | Performed by: INTERNAL MEDICINE

## 2024-10-15 PROCEDURE — 99214 OFFICE O/P EST MOD 30 MIN: CPT | Performed by: INTERNAL MEDICINE

## 2024-10-18 ENCOUNTER — HOSPITAL ENCOUNTER (OUTPATIENT)
Facility: HOSPITAL | Age: 29
Setting detail: INFUSION SERIES
End: 2024-10-18

## 2024-10-21 ENCOUNTER — TELEPHONE (OUTPATIENT)
Age: 29
End: 2024-10-21

## 2024-10-21 NOTE — TELEPHONE ENCOUNTER
Returned call to patient and verified using 2 patient identifiers. Pt was calling regarding the Saxenda. Pt was informed that the Saxenda was denied by the insurance must have tried and failed oral medications. Pt states that she is not able to take oral medications due to her POTS. Pt was in the hospital so she was not able to keep the September appointment but is scheduled for 11/12.  Pt was advised the information was submitted to them but it was still denied. Pt was advised to schedule a sooner appointment to discuss maybe consider a virtual visit. Pt was informed will make our PSR aware that she will be calling the office back to schedule a virtual appointment.     Pt voiced understandings and will call the office back to get scheduled.

## 2024-10-24 ENCOUNTER — TELEPHONE (OUTPATIENT)
Age: 29
End: 2024-10-24

## 2024-10-31 ENCOUNTER — TELEPHONE (OUTPATIENT)
Age: 29
End: 2024-10-31

## 2024-10-31 LAB
ACTH PLAS-MCNC: 26.6 PG/ML (ref 7.2–63.3)
ALBUMIN SERPL-MCNC: 4.2 G/DL (ref 4–5)
ALP SERPL-CCNC: 98 IU/L (ref 44–121)
ALT SERPL-CCNC: 55 IU/L (ref 0–32)
AST SERPL-CCNC: 37 IU/L (ref 0–40)
BASOPHILS # BLD AUTO: 0.1 X10E3/UL (ref 0–0.2)
BASOPHILS NFR BLD AUTO: 1 %
BILIRUB DIRECT SERPL-MCNC: <0.1 MG/DL (ref 0–0.4)
BILIRUB SERPL-MCNC: <0.2 MG/DL (ref 0–1.2)
BUN SERPL-MCNC: 7 MG/DL (ref 6–20)
BUN/CREAT SERPL: 8 (ref 9–23)
CALCIUM SERPL-MCNC: 9.6 MG/DL (ref 8.7–10.2)
CHLORIDE SERPL-SCNC: 106 MMOL/L (ref 96–106)
CO2 SERPL-SCNC: 20 MMOL/L (ref 20–29)
CREAT SERPL-MCNC: 0.83 MG/DL (ref 0.57–1)
EGFRCR SERPLBLD CKD-EPI 2021: 100 ML/MIN/1.73
EOSINOPHIL # BLD AUTO: 0.2 X10E3/UL (ref 0–0.4)
EOSINOPHIL NFR BLD AUTO: 2 %
ERYTHROCYTE [DISTWIDTH] IN BLOOD BY AUTOMATED COUNT: 12.9 % (ref 11.7–15.4)
ESTRADIOL SERPL-MCNC: 38.7 PG/ML
FSH SERPL-ACNC: 5.4 MIU/ML
GLUCOSE SERPL-MCNC: 97 MG/DL (ref 70–99)
HCT VFR BLD AUTO: 41.4 % (ref 34–46.6)
HGB BLD-MCNC: 12.9 G/DL (ref 11.1–15.9)
IGF-I SERPL-MCNC: 397 NG/ML (ref 91–308)
IMM GRANULOCYTES # BLD AUTO: 0.1 X10E3/UL (ref 0–0.1)
IMM GRANULOCYTES NFR BLD AUTO: 1 %
LH SERPL-ACNC: 11.4 MIU/ML
LYMPHOCYTES # BLD AUTO: 2.8 X10E3/UL (ref 0.7–3.1)
LYMPHOCYTES NFR BLD AUTO: 27 %
MCH RBC QN AUTO: 28.7 PG (ref 26.6–33)
MCHC RBC AUTO-ENTMCNC: 31.2 G/DL (ref 31.5–35.7)
MCV RBC AUTO: 92 FL (ref 79–97)
MONOCYTES # BLD AUTO: 0.9 X10E3/UL (ref 0.1–0.9)
MONOCYTES NFR BLD AUTO: 9 %
NEUTROPHILS # BLD AUTO: 6.2 X10E3/UL (ref 1.4–7)
NEUTROPHILS NFR BLD AUTO: 60 %
PLATELET # BLD AUTO: 374 X10E3/UL (ref 150–450)
POTASSIUM SERPL-SCNC: 4.3 MMOL/L (ref 3.5–5.2)
PROLACTIN SERPL-MCNC: 11.3 NG/ML (ref 4.8–33.4)
PROT SERPL-MCNC: 7.1 G/DL (ref 6–8.5)
RBC # BLD AUTO: 4.5 X10E6/UL (ref 3.77–5.28)
SODIUM SERPL-SCNC: 140 MMOL/L (ref 134–144)
WBC # BLD AUTO: 10.2 X10E3/UL (ref 3.4–10.8)

## 2024-10-31 NOTE — TELEPHONE ENCOUNTER
Called patient in regards to an appointment reminder. Patient did not answer, left voicemail asking patient to call back.

## 2024-11-01 ENCOUNTER — TELEMEDICINE (OUTPATIENT)
Age: 29
End: 2024-11-01
Payer: MEDICAID

## 2024-11-01 VITALS
HEIGHT: 65 IN | DIASTOLIC BLOOD PRESSURE: 67 MMHG | BODY MASS INDEX: 48 KG/M2 | WEIGHT: 288.1 LBS | SYSTOLIC BLOOD PRESSURE: 128 MMHG

## 2024-11-01 DIAGNOSIS — E66.813 CLASS 3 SEVERE OBESITY DUE TO EXCESS CALORIES WITH SERIOUS COMORBIDITY AND BODY MASS INDEX (BMI) OF 45.0 TO 49.9 IN ADULT: Primary | ICD-10-CM

## 2024-11-01 DIAGNOSIS — E66.01 CLASS 3 SEVERE OBESITY DUE TO EXCESS CALORIES WITH SERIOUS COMORBIDITY AND BODY MASS INDEX (BMI) OF 45.0 TO 49.9 IN ADULT: Primary | ICD-10-CM

## 2024-11-01 DIAGNOSIS — G47.8 UNREFRESHED BY SLEEP: ICD-10-CM

## 2024-11-01 DIAGNOSIS — R94.5 ABNORMAL LIVER FUNCTION: ICD-10-CM

## 2024-11-01 DIAGNOSIS — E28.2 PCOS (POLYCYSTIC OVARIAN SYNDROME): ICD-10-CM

## 2024-11-01 DIAGNOSIS — G90.A POTS (POSTURAL ORTHOSTATIC TACHYCARDIA SYNDROME): ICD-10-CM

## 2024-11-01 PROCEDURE — 99214 OFFICE O/P EST MOD 30 MIN: CPT | Performed by: FAMILY MEDICINE

## 2024-11-01 ASSESSMENT — PATIENT HEALTH QUESTIONNAIRE - PHQ9
2. FEELING DOWN, DEPRESSED OR HOPELESS: NOT AT ALL
5. POOR APPETITE OR OVEREATING: NOT AT ALL
3. TROUBLE FALLING OR STAYING ASLEEP: NOT AT ALL
8. MOVING OR SPEAKING SO SLOWLY THAT OTHER PEOPLE COULD HAVE NOTICED. OR THE OPPOSITE, BEING SO FIGETY OR RESTLESS THAT YOU HAVE BEEN MOVING AROUND A LOT MORE THAN USUAL: NOT AT ALL
SUM OF ALL RESPONSES TO PHQ9 QUESTIONS 1 & 2: 0
SUM OF ALL RESPONSES TO PHQ QUESTIONS 1-9: 0
4. FEELING TIRED OR HAVING LITTLE ENERGY: NOT AT ALL
7. TROUBLE CONCENTRATING ON THINGS, SUCH AS READING THE NEWSPAPER OR WATCHING TELEVISION: NOT AT ALL
SUM OF ALL RESPONSES TO PHQ QUESTIONS 1-9: 0
1. LITTLE INTEREST OR PLEASURE IN DOING THINGS: NOT AT ALL
6. FEELING BAD ABOUT YOURSELF - OR THAT YOU ARE A FAILURE OR HAVE LET YOURSELF OR YOUR FAMILY DOWN: NOT AT ALL
9. THOUGHTS THAT YOU WOULD BE BETTER OFF DEAD, OR OF HURTING YOURSELF: NOT AT ALL

## 2024-11-01 NOTE — PROGRESS NOTES
Mandy Fuentes is a 28 y.o. female who was seen by synchronous (real-time) audio-video technology on 11/1/2024.      Consent:  She and/or her healthcare decision maker is aware that this patient-initiated Telehealth encounter is a billable service, with coverage as determined by her insurance carrier. She is aware that she may receive a bill and has provided verbal consent to proceed: Yes    I was at home while conducting this encounter.    Mandy Fuentes is a 28 y.o. female  who is here for her follow up  Evaluation for the medical bariatric care.    Mandy Fuentes, was evaluated through a synchronous (real-time) audio-video encounter. The patient (or guardian if applicable) is aware that this is a billable service, which includes applicable co-pays. This Virtual Visit was conducted with patient's (and/or legal guardian's) consent. Patient identification was verified, and a caregiver was present when appropriate.   The patient was located at Home: [address] [place] [id number]  Provider was located at Home (Appt Dept State): [place]  Confirm you are appropriately licensed, registered, or certified to deliver care in the state where the patient is located as indicated above. If you are not or unsure, please re-schedule the visit: Yes, I confirm.          --Thuy Taylor MD on 11/2/2024 at 4:50 PM    An electronic signature was used to authenticate this note.   CC: I want to be healthier  She was in the hospital recently  She has a hysterectomy planned soon  She has POTS and eats a lot of sodium as treatment which leads to water retention  Also does not exercise much  Insurance denied the wegovy she was told she must try oral meds but she cannot take a stimulant      Weight History  Current weight 288( 289) and BMI is Body mass index is 47.94 kg/m².  Goal weight BMI 29,   Highest weight 289   (See weight gain time line scanned into media section of chart)      Hunger control:

## 2024-11-01 NOTE — PROGRESS NOTES
Identified pt with two pt identifiers (name and ). Reviewed chart in preparation for visit and have obtained necessary documentation.    Mandy Fuentes is a 28 y.o. female  No chief complaint on file.    /67   Ht 1.651 m (5' 5\")   Wt 130.7 kg (288 lb 1.6 oz)   BMI 47.94 kg/m²     1. Have you been to the ER, urgent care clinic since your last visit?  Hospitalized since your last visit?no    2. Have you seen or consulted any other health care providers outside of the [facility] since your last visit?  Include any pap smears or colon screening. no

## 2024-11-05 ENCOUNTER — CLINICAL DOCUMENTATION (OUTPATIENT)
Age: 29
End: 2024-11-05

## 2024-11-05 NOTE — PROGRESS NOTES
[facility] that the appeals must be faxed to the number on the denial letter.     Refaxed the appeal information to [phone] with the confirmation page received. Waiting for the determination.

## 2024-11-12 ENCOUNTER — OFFICE VISIT (OUTPATIENT)
Age: 29
End: 2024-11-12
Payer: MEDICAID

## 2024-11-12 VITALS
OXYGEN SATURATION: 94 % | HEART RATE: 118 BPM | WEIGHT: 293 LBS | DIASTOLIC BLOOD PRESSURE: 91 MMHG | RESPIRATION RATE: 16 BRPM | SYSTOLIC BLOOD PRESSURE: 132 MMHG | BODY MASS INDEX: 49.09 KG/M2 | TEMPERATURE: 97.9 F

## 2024-11-12 DIAGNOSIS — M45.0 ANKYLOSING SPONDYLITIS OF MULTIPLE SITES IN SPINE (HCC): Primary | ICD-10-CM

## 2024-11-12 PROCEDURE — 99215 OFFICE O/P EST HI 40 MIN: CPT | Performed by: PEDIATRICS

## 2024-11-12 RX ORDER — ACETYLCYSTEINE 600 MG
CAPSULE ORAL
COMMUNITY
Start: 2024-11-06

## 2024-11-12 RX ORDER — CYCLOBENZAPRINE HCL 5 MG
5 TABLET ORAL 3 TIMES DAILY PRN
Qty: 90 TABLET | Refills: 0 | Status: SHIPPED | OUTPATIENT
Start: 2024-11-12

## 2024-11-12 RX ORDER — GOLIMUMAB 50 MG/.5ML
50 INJECTION, SOLUTION SUBCUTANEOUS
Qty: 0.5 ML | Refills: 6 | Status: ACTIVE | OUTPATIENT
Start: 2024-11-12

## 2024-11-12 ASSESSMENT — PATIENT HEALTH QUESTIONNAIRE - PHQ9
SUM OF ALL RESPONSES TO PHQ QUESTIONS 1-9: 0
2. FEELING DOWN, DEPRESSED OR HOPELESS: NOT AT ALL
SUM OF ALL RESPONSES TO PHQ9 QUESTIONS 1 & 2: 0
SUM OF ALL RESPONSES TO PHQ QUESTIONS 1-9: 0
SUM OF ALL RESPONSES TO PHQ QUESTIONS 1-9: 0
1. LITTLE INTEREST OR PLEASURE IN DOING THINGS: NOT AT ALL
SUM OF ALL RESPONSES TO PHQ QUESTIONS 1-9: 0

## 2024-11-12 NOTE — PROGRESS NOTES
RHEUMATOLOGY PROBLEM LIST AND CHIEF COMPLAINT  1. Spondyloarthropathy / AS (2013) - Inflammatory bowel disease related arthritis    Therapy History:  Prior DMARDs: Cimzia (9/2015-11/2015), Remicade (2014 - not effective), Humira (2014 - effective but lupus-like syndrome, polyarthritis, double-stranded DNA / BLANCA positive), Orencia (07/2016-08/2016), Leflunomide (5/2018-11/2018, stopped due to AEs), Methotrexate (6/2014-9/2014, restarted 02/2016 - 6/2017; AEs: Headaches, restarted 11/2019-02/2020)  Current DMARDs: Simponi (10/2016-current)    2. Crohn's disease   3. Chronic pain syndrome     INTERVAL HISTORY  This is a 28 y.o.  female.  Today, the patient complains of pain in the joints.  Location: generalized  Severity: 10 on a scale of 0-10   Timing: all day  Duration: Several months  Context/Associated signs and symptoms: The patient continues on Simponi 50 mg q4 weeks. Reviewed MRI pelivs on 7/19/24 which showed mild bilateral sacroiliac joint OA, without MRI evidence of sacroiliitis. She had SI joint steroid injections in September 2024 which did not help. Today, she is complaining of generalized body pain. The pain is the worst in her lower back and pelvis and she describes feeling like \"someone is digging a screwdriver in her lower back.\" Light touch will cause pain. She will get pseudo seizures due to pain. Her mom states that Mandy is in so much pain that she has to help her do activity like eat and get dressed. She has not left the house other than doctor's appointments due to pain.     She continues to have episodes of vision loss. This will occur 2-3x per week. She has been undergoing workup with neuro ophthalmology for this.     She is scheduled for a hysterectomy next month due to heavy menstrual bleeding and pelvic pain.     RHEUMATOLOGY REVIEW OF SYSTEMS   Positives as per history  Negatives as follows:  CONSTITUTlONAL:  Denies unexplained persistent fevers, weight change, chronic

## 2024-11-12 NOTE — PROGRESS NOTES
Chief Complaint   Patient presents with    Joint Pain     1. Have you been to the ER, urgent care clinic since your last visit?  Hospitalized since your last visit?No    2. Have you seen or consulted any other health care providers outside of the [facility] since your last visit?  Include any pap smears or colon screening. No

## 2024-11-15 ENCOUNTER — HOSPITAL ENCOUNTER (OUTPATIENT)
Facility: HOSPITAL | Age: 29
Discharge: HOME | End: 2024-11-18
Attending: INTERNAL MEDICINE
Payer: MEDICAID

## 2024-11-15 DIAGNOSIS — N64.3 GALACTORRHEA: ICD-10-CM

## 2024-11-15 PROCEDURE — A9575 INJ GADOTERATE MEGLUMI 0.1ML: HCPCS | Performed by: INTERNAL MEDICINE

## 2024-11-15 PROCEDURE — 6360000004 HC RX CONTRAST MEDICATION: Performed by: INTERNAL MEDICINE

## 2024-11-15 PROCEDURE — 70553 MRI BRAIN STEM W/O & W/DYE: CPT | Performed by: INTERNAL MEDICINE

## 2024-11-15 RX ORDER — GADOTERATE MEGLUMINE 376.9 MG/ML
20 INJECTION INTRAVENOUS
Status: COMPLETED | OUTPATIENT
Start: 2024-11-15 | End: 2024-11-15

## 2024-11-15 RX ADMIN — GADOTERATE MEGLUMINE 20 ML: 376.9 INJECTION INTRAVENOUS at 16:55

## 2024-11-30 RX ORDER — ERENUMAB-AOOE 140 MG/ML
INJECTION, SOLUTION SUBCUTANEOUS
Qty: 1 ML | Refills: 0 | Status: SHIPPED | OUTPATIENT
Start: 2024-11-30

## 2024-12-01 DIAGNOSIS — N64.3 GALACTORRHEA: ICD-10-CM

## 2024-12-01 DIAGNOSIS — N64.3 GALACTORRHEA: Primary | ICD-10-CM

## 2024-12-09 ENCOUNTER — OFFICE VISIT (OUTPATIENT)
Age: 29
End: 2024-12-09
Payer: MEDICAID

## 2024-12-09 ENCOUNTER — TELEPHONE (OUTPATIENT)
Age: 29
End: 2024-12-09

## 2024-12-09 VITALS
TEMPERATURE: 97.6 F | RESPIRATION RATE: 16 BRPM | HEART RATE: 121 BPM | DIASTOLIC BLOOD PRESSURE: 91 MMHG | OXYGEN SATURATION: 96 % | BODY MASS INDEX: 48.43 KG/M2 | WEIGHT: 290.7 LBS | HEIGHT: 65 IN | SYSTOLIC BLOOD PRESSURE: 127 MMHG

## 2024-12-09 DIAGNOSIS — E66.813 CLASS 3 SEVERE OBESITY DUE TO EXCESS CALORIES WITH SERIOUS COMORBIDITY AND BODY MASS INDEX (BMI) OF 45.0 TO 49.9 IN ADULT: Primary | ICD-10-CM

## 2024-12-09 DIAGNOSIS — E28.2 PCOS (POLYCYSTIC OVARIAN SYNDROME): ICD-10-CM

## 2024-12-09 DIAGNOSIS — E66.01 CLASS 3 SEVERE OBESITY DUE TO EXCESS CALORIES WITH SERIOUS COMORBIDITY AND BODY MASS INDEX (BMI) OF 45.0 TO 49.9 IN ADULT: Primary | ICD-10-CM

## 2024-12-09 DIAGNOSIS — R94.5 ABNORMAL LIVER FUNCTION: ICD-10-CM

## 2024-12-09 DIAGNOSIS — G93.2 IIH (IDIOPATHIC INTRACRANIAL HYPERTENSION): ICD-10-CM

## 2024-12-09 DIAGNOSIS — G90.A POTS (POSTURAL ORTHOSTATIC TACHYCARDIA SYNDROME): ICD-10-CM

## 2024-12-09 PROCEDURE — 99214 OFFICE O/P EST MOD 30 MIN: CPT | Performed by: FAMILY MEDICINE

## 2024-12-09 RX ORDER — LISDEXAMFETAMINE DIMESYLATE 50 MG/1
50 CAPSULE ORAL DAILY
COMMUNITY
Start: 2024-11-13

## 2024-12-09 RX ORDER — SEMAGLUTIDE 0.25 MG/.5ML
0.25 INJECTION, SOLUTION SUBCUTANEOUS
Qty: 2 ML | Refills: 0 | Status: SHIPPED | OUTPATIENT
Start: 2024-12-09

## 2024-12-09 ASSESSMENT — PATIENT HEALTH QUESTIONNAIRE - PHQ9
4. FEELING TIRED OR HAVING LITTLE ENERGY: NOT AT ALL
SUM OF ALL RESPONSES TO PHQ QUESTIONS 1-9: 0
7. TROUBLE CONCENTRATING ON THINGS, SUCH AS READING THE NEWSPAPER OR WATCHING TELEVISION: NOT AT ALL
8. MOVING OR SPEAKING SO SLOWLY THAT OTHER PEOPLE COULD HAVE NOTICED. OR THE OPPOSITE, BEING SO FIGETY OR RESTLESS THAT YOU HAVE BEEN MOVING AROUND A LOT MORE THAN USUAL: NOT AT ALL
6. FEELING BAD ABOUT YOURSELF - OR THAT YOU ARE A FAILURE OR HAVE LET YOURSELF OR YOUR FAMILY DOWN: NOT AT ALL
1. LITTLE INTEREST OR PLEASURE IN DOING THINGS: NOT AT ALL
SUM OF ALL RESPONSES TO PHQ QUESTIONS 1-9: 0
3. TROUBLE FALLING OR STAYING ASLEEP: NOT AT ALL
9. THOUGHTS THAT YOU WOULD BE BETTER OFF DEAD, OR OF HURTING YOURSELF: NOT AT ALL
SUM OF ALL RESPONSES TO PHQ QUESTIONS 1-9: 0
SUM OF ALL RESPONSES TO PHQ9 QUESTIONS 1 & 2: 0
2. FEELING DOWN, DEPRESSED OR HOPELESS: NOT AT ALL
SUM OF ALL RESPONSES TO PHQ QUESTIONS 1-9: 0
5. POOR APPETITE OR OVEREATING: NOT AT ALL

## 2024-12-09 NOTE — PROGRESS NOTES
Identified pt with two pt identifiers (name and ). Reviewed chart in preparation for visit and have obtained necessary documentation.    Mandy Fuentes is a 28 y.o. female  Chief Complaint   Patient presents with    Weight Management     WLC/ 1 month      BP (!) 127/91 (Site: Left Lower Arm, Position: Sitting, Cuff Size: Large Adult)   Pulse (!) 121   Temp 97.6 °F (36.4 °C) (Oral)   Resp 16   Ht 1.651 m (5' 5\")   Wt 131.9 kg (290 lb 11.2 oz)   LMP  (LMP Unknown)   SpO2 96%   BMI 48.38 kg/m²     1. Have you been to the ER, urgent care clinic since your last visit?  Hospitalized since your last visit?no    2. Have you seen or consulted any other health care providers outside of the [facility] since your last visit?  Include any pap smears or colon screening. No    Patient attended triage but did not bring homework form. Patient instructed to email or fax completed homework form to us. Patient informed that not bringing the homework form can result in not being seen next time.     Patient and provider made aware of elevated BP x2. Patient asymptomatic. Patient reminded to monitor BP, continue to take BP medications if prescribed, and follow up with PCP/Cardiologist.  Patient expressed understanding and agreement.        
taking 1 tablet daily in the morning.      diazePAM (VALIUM) 5 MG tablet Take 1 tablet by mouth 2 times daily.      famotidine (PEPCID) 40 MG tablet Take 1 tablet by mouth nightly      lurasidone (LATUDA) 40 MG TABS tablet Take 1 tablet by mouth Daily with supper      omeprazole (PRILOSEC) 40 MG delayed release capsule Take 1 capsule by mouth daily      venlafaxine (EFFEXOR XR) 75 MG extended release capsule Take 1 capsule by mouth daily      tapentadol (NUCYNTA) 100 MG TABS Take 1 tablet by mouth every 4 hours. (Patient not taking: Reported on 12/9/2024)      liraglutide-weight management (SAXENDA) 18 MG/3ML SOPN 0.6 mg a day for 7 days, then 1.2 mg a day for 7 days, then 1.8 mg a day for 7 days, then 2.4 mg a day for 7 days then 3 mg a day ongoing (Patient not taking: Reported on 9/12/2024) 5 Adjustable Dose Pre-filled Pen Syringe 2    EPINEPHrine (EPIPEN 2-GHASSAN) 0.3 MG/0.3ML SOAJ injection Inject 0.3 mLs into the muscle once for 1 dose Use as directed for allergic reaction 0.3 mL 0     No current facility-administered medications for this visit.       Waist Circumference: See Weight Management Doc Flowsheet  Neck Circumference: See Weight Management Doc Flowsheet  Percent Body Fat: See Weight Management Doc Flowsheet      12/9/2024     1:06 PM 12/9/2024     1:00 PM 11/12/2024     1:36 PM 11/1/2024    10:15 AM 10/10/2024    12:43 PM 9/18/2024    11:00 AM 9/12/2024    12:05 PM   Weight Metrics   Weight 290 lb 11.2 oz  295 lb 288 lb 1.6 oz 294 lb 6.4 oz 290 lb 12.6 oz 282 lb   Neck (Inches)  15.25 in        Waist Measure Inches  47.5 in        Body Fat %  47.9 %        BMI (Calculated) 48.5 kg/m2  0 kg/m2 48 kg/m2 49 kg/m2 50 kg/m2 48.5 kg/m2          No data to display                   Labs:     Review of Systems  Complete ROS negative except where noted above      Physical Exam    Vital Signs Reviewed  Weight Management Metrics Reviewed    Vital Signs Reviewed  Appearance: Obese, A&O x 3, NAD  HEENT:  NC/AT, EOMI,

## 2024-12-09 NOTE — TELEPHONE ENCOUNTER
Patient was in office and requested we call her insurance company to see if a visit with the dietician is covered under her plan. Patient also requested a call back for an update and to be scheduled with Audelia. Patient call back # is [phone].

## 2024-12-09 NOTE — TELEPHONE ENCOUNTER
Called [facility] provider line and spoke with Carole who stated CPT code 81737 is covered at 100% with no copays or coinsurance. Called patient in attempt to schedule a visit with Audelia. No answer, left voicemail advising patient to return call.

## 2024-12-13 ENCOUNTER — CLINICAL DOCUMENTATION (OUTPATIENT)
Age: 29
End: 2024-12-13

## 2024-12-20 ENCOUNTER — HOSPITAL ENCOUNTER (EMERGENCY)
Facility: HOSPITAL | Age: 29
Discharge: HOME | End: 2024-12-20
Attending: EMERGENCY MEDICINE
Payer: MEDICAID

## 2024-12-20 ENCOUNTER — APPOINTMENT (OUTPATIENT)
Facility: HOSPITAL | Age: 29
End: 2024-12-20
Payer: MEDICAID

## 2024-12-20 VITALS
DIASTOLIC BLOOD PRESSURE: 91 MMHG | HEIGHT: 65 IN | WEIGHT: 290 LBS | SYSTOLIC BLOOD PRESSURE: 130 MMHG | OXYGEN SATURATION: 92 % | RESPIRATION RATE: 21 BRPM | TEMPERATURE: 98.1 F | HEART RATE: 88 BPM | BODY MASS INDEX: 48.32 KG/M2

## 2024-12-20 DIAGNOSIS — N30.00 ACUTE CYSTITIS WITHOUT HEMATURIA: Primary | ICD-10-CM

## 2024-12-20 DIAGNOSIS — L03.90 CELLULITIS, UNSPECIFIED CELLULITIS SITE: ICD-10-CM

## 2024-12-20 DIAGNOSIS — R00.2 PALPITATIONS: ICD-10-CM

## 2024-12-20 LAB
ALBUMIN SERPL-MCNC: 3.8 G/DL (ref 3.5–5)
ALBUMIN/GLOB SERPL: 0.8 (ref 1.1–2.2)
ALP SERPL-CCNC: 90 U/L (ref 45–117)
ALT SERPL-CCNC: 89 U/L (ref 12–78)
ANION GAP SERPL CALC-SCNC: 9 MMOL/L (ref 2–12)
APPEARANCE UR: ABNORMAL
AST SERPL-CCNC: 58 U/L (ref 15–37)
BACTERIA URNS QL MICRO: ABNORMAL /HPF
BASOPHILS # BLD: 0.1 K/UL (ref 0–0.1)
BASOPHILS NFR BLD: 1 % (ref 0–1)
BILIRUB SERPL-MCNC: 0.3 MG/DL (ref 0.2–1)
BILIRUB UR QL: NEGATIVE
BUN SERPL-MCNC: 11 MG/DL (ref 6–20)
BUN/CREAT SERPL: 11 (ref 12–20)
CALCIUM SERPL-MCNC: 10.3 MG/DL (ref 8.5–10.1)
CHLORIDE SERPL-SCNC: 101 MMOL/L (ref 97–108)
CLUE CELLS VAG QL WET PREP: NORMAL
CO2 SERPL-SCNC: 27 MMOL/L (ref 21–32)
COLOR UR: ABNORMAL
COMMENT:: NORMAL
CREAT SERPL-MCNC: 0.96 MG/DL (ref 0.55–1.02)
DIFFERENTIAL METHOD BLD: ABNORMAL
EOSINOPHIL # BLD: 0.6 K/UL (ref 0–0.4)
EOSINOPHIL NFR BLD: 5 % (ref 0–7)
EPITH CASTS URNS QL MICRO: ABNORMAL /LPF
ERYTHROCYTE [DISTWIDTH] IN BLOOD BY AUTOMATED COUNT: 13.7 % (ref 11.5–14.5)
GLOBULIN SER CALC-MCNC: 4.9 G/DL (ref 2–4)
GLUCOSE SERPL-MCNC: 88 MG/DL (ref 65–100)
GLUCOSE UR STRIP.AUTO-MCNC: NEGATIVE MG/DL
HCT VFR BLD AUTO: 41.4 % (ref 35–47)
HGB BLD-MCNC: 13.7 G/DL (ref 11.5–16)
HGB UR QL STRIP: ABNORMAL
IMM GRANULOCYTES # BLD AUTO: 0.1 K/UL (ref 0–0.04)
IMM GRANULOCYTES NFR BLD AUTO: 1 % (ref 0–0.5)
KETONES UR QL STRIP.AUTO: NEGATIVE MG/DL
LACTATE SERPL-SCNC: 1.1 MMOL/L (ref 0.4–2)
LEUKOCYTE ESTERASE UR QL STRIP.AUTO: ABNORMAL
LYMPHOCYTES # BLD: 3.6 K/UL (ref 0.8–3.5)
LYMPHOCYTES NFR BLD: 32 % (ref 12–49)
MAGNESIUM SERPL-MCNC: 1.7 MG/DL (ref 1.6–2.4)
MCH RBC QN AUTO: 28.6 PG (ref 26–34)
MCHC RBC AUTO-ENTMCNC: 33.1 G/DL (ref 30–36.5)
MCV RBC AUTO: 86.4 FL (ref 80–99)
MONOCYTES # BLD: 1 K/UL (ref 0–1)
MONOCYTES NFR BLD: 9 % (ref 5–13)
NEUTS SEG # BLD: 6 K/UL (ref 1.8–8)
NEUTS SEG NFR BLD: 52 % (ref 32–75)
NITRITE UR QL STRIP.AUTO: NEGATIVE
NRBC # BLD: 0 K/UL (ref 0–0.01)
NRBC BLD-RTO: 0 PER 100 WBC
PH UR STRIP: 6 (ref 5–8)
PLATELET # BLD AUTO: 365 K/UL (ref 150–400)
PMV BLD AUTO: 11.3 FL (ref 8.9–12.9)
POTASSIUM SERPL-SCNC: 4.3 MMOL/L (ref 3.5–5.1)
PROT SERPL-MCNC: 8.7 G/DL (ref 6.4–8.2)
PROT UR STRIP-MCNC: NEGATIVE MG/DL
RBC # BLD AUTO: 4.79 M/UL (ref 3.8–5.2)
RBC #/AREA URNS HPF: ABNORMAL /HPF (ref 0–5)
SODIUM SERPL-SCNC: 137 MMOL/L (ref 136–145)
SP GR UR REFRACTOMETRY: 1.01 (ref 1–1.03)
SPECIMEN HOLD: NORMAL
T VAGINALIS VAG QL WET PREP: NORMAL
URINE CULTURE IF INDICATED: ABNORMAL
UROBILINOGEN UR QL STRIP.AUTO: 0.2 EU/DL (ref 0.2–1)
WBC # BLD AUTO: 11.3 K/UL (ref 3.6–11)
WBC URNS QL MICRO: ABNORMAL /HPF (ref 0–4)
YEAST: NORMAL

## 2024-12-20 PROCEDURE — 83735 ASSAY OF MAGNESIUM: CPT

## 2024-12-20 PROCEDURE — 74177 CT ABD & PELVIS W/CONTRAST: CPT

## 2024-12-20 PROCEDURE — 85025 COMPLETE CBC W/AUTO DIFF WBC: CPT

## 2024-12-20 PROCEDURE — 2580000003 HC RX 258: Performed by: EMERGENCY MEDICINE

## 2024-12-20 PROCEDURE — 81001 URINALYSIS AUTO W/SCOPE: CPT

## 2024-12-20 PROCEDURE — 87210 SMEAR WET MOUNT SALINE/INK: CPT

## 2024-12-20 PROCEDURE — 99285 EMERGENCY DEPT VISIT HI MDM: CPT

## 2024-12-20 PROCEDURE — 87591 N.GONORRHOEAE DNA AMP PROB: CPT

## 2024-12-20 PROCEDURE — 83605 ASSAY OF LACTIC ACID: CPT

## 2024-12-20 PROCEDURE — 80053 COMPREHEN METABOLIC PANEL: CPT

## 2024-12-20 PROCEDURE — 87086 URINE CULTURE/COLONY COUNT: CPT

## 2024-12-20 PROCEDURE — 36415 COLL VENOUS BLD VENIPUNCTURE: CPT

## 2024-12-20 PROCEDURE — 93005 ELECTROCARDIOGRAM TRACING: CPT | Performed by: EMERGENCY MEDICINE

## 2024-12-20 PROCEDURE — 87491 CHLMYD TRACH DNA AMP PROBE: CPT

## 2024-12-20 PROCEDURE — 6360000004 HC RX CONTRAST MEDICATION: Performed by: EMERGENCY MEDICINE

## 2024-12-20 RX ORDER — 0.9 % SODIUM CHLORIDE 0.9 %
1000 INTRAVENOUS SOLUTION INTRAVENOUS ONCE
Status: COMPLETED | OUTPATIENT
Start: 2024-12-20 | End: 2024-12-20

## 2024-12-20 RX ORDER — IOPAMIDOL 755 MG/ML
100 INJECTION, SOLUTION INTRAVASCULAR
Status: COMPLETED | OUTPATIENT
Start: 2024-12-20 | End: 2024-12-20

## 2024-12-20 RX ADMIN — SODIUM CHLORIDE 1000 ML: 9 INJECTION, SOLUTION INTRAVENOUS at 16:33

## 2024-12-20 RX ADMIN — IOPAMIDOL 100 ML: 755 INJECTION, SOLUTION INTRAVENOUS at 16:48

## 2024-12-20 ASSESSMENT — PAIN SCALES - GENERAL: PAINLEVEL_OUTOF10: 10

## 2024-12-20 ASSESSMENT — PAIN - FUNCTIONAL ASSESSMENT: PAIN_FUNCTIONAL_ASSESSMENT: 0-10

## 2024-12-20 ASSESSMENT — LIFESTYLE VARIABLES
HOW OFTEN DO YOU HAVE A DRINK CONTAINING ALCOHOL: NEVER
HOW MANY STANDARD DRINKS CONTAINING ALCOHOL DO YOU HAVE ON A TYPICAL DAY: PATIENT DOES NOT DRINK

## 2024-12-20 ASSESSMENT — PAIN DESCRIPTION - LOCATION: LOCATION: HEAD

## 2024-12-20 NOTE — ED TRIAGE NOTES
Pt brought to room via W/C for syncopal episodes. Reports 10-15 episodes since yesterday. Pt has POTS and receives weekly fluids. Reports \"jitters, palpitations, and fevers.\"    Hysterectomy  in November. States developed infection and has an appt next week with infectious disease.  States has cellulitis at incision site and took keflex.   Reports wound opened yesterday and draining pus.      Pt currently taking flagyl for BV,

## 2024-12-20 NOTE — ED PROVIDER NOTES
EMERGENCY DEPARTMENT PHYSICIAN NOTE     Patient: Mandy Fuentes     Time of Service: 12/20/2024  2:29 PM     Chief complaint:   Chief Complaint   Patient presents with    Palpitations        HISTORY:  Patient is a 29 y.o. female who presents to the emergency department with complaints of palpitations.      Past Medical History:   Diagnosis Date    Abnormal Pap smear of cervix     Adiposity 12/29/2017    Adverse effect of anesthesia     woke up during colonoscopy    Allergic rhinitis due to other allergen     Amplified musculoskeletal pain syndrome     RND    Anxiety and depression     Arthritis     spondylar arthritis    Asthma     seasonal    Crohn's colitis (HCC)     Difficult intravenous access     Gastroparesis     GERD (gastroesophageal reflux disease)     H/O mammogram 10/14/2022    normal    Headaches due to old head injury     IIH (idiopathic intracranial hypertension)     Inflammatory arthritis     Migraine     occular    MRSA (methicillin resistant Staphylococcus aureus) 2016    right foot and was treated    PCOS (polycystic ovarian syndrome)     POTS (postural orthostatic tachycardia syndrome)     Dr. 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

## 2024-12-20 NOTE — DISCHARGE INSTRUCTIONS
Routine appointments for health maintenance with a primary care provider are very important and emergency department visits are no substitute.  You should review all findings and test results from your visit today with your primary care physician.        We recommended that you take medications as prescribed.    Drink lots of fluids.    Return to the emergency department for any new or concerning signs/symptoms or failure to improve.

## 2024-12-21 LAB
BACTERIA SPEC CULT: NORMAL
EKG ATRIAL RATE: 87 BPM
EKG DIAGNOSIS: NORMAL
EKG P AXIS: 24 DEGREES
EKG P-R INTERVAL: 134 MS
EKG Q-T INTERVAL: 340 MS
EKG QRS DURATION: 86 MS
EKG QTC CALCULATION (BAZETT): 409 MS
EKG R AXIS: 20 DEGREES
EKG T AXIS: 27 DEGREES
EKG VENTRICULAR RATE: 87 BPM
SERVICE CMNT-IMP: NORMAL

## 2024-12-21 PROCEDURE — 93010 ELECTROCARDIOGRAM REPORT: CPT | Performed by: HOSPITALIST

## 2024-12-23 LAB
C TRACH DNA SPEC QL NAA+PROBE: NEGATIVE
N GONORRHOEA DNA SPEC QL NAA+PROBE: NEGATIVE
SAMPLE TYPE: NORMAL
SERVICE CMNT-IMP: NORMAL
SPECIMEN SOURCE: NORMAL

## 2025-01-15 LAB — HBA1C MFR BLD HPLC: 5.7 %

## 2025-01-21 ENCOUNTER — OFFICE VISIT (OUTPATIENT)
Age: 30
End: 2025-01-21
Payer: MEDICAID

## 2025-01-21 VITALS
HEART RATE: 123 BPM | OXYGEN SATURATION: 95 % | WEIGHT: 289 LBS | RESPIRATION RATE: 18 BRPM | TEMPERATURE: 97.9 F | HEIGHT: 65 IN | BODY MASS INDEX: 48.15 KG/M2

## 2025-01-21 DIAGNOSIS — N30.00 ACUTE CYSTITIS WITHOUT HEMATURIA: Primary | ICD-10-CM

## 2025-01-21 PROCEDURE — 99214 OFFICE O/P EST MOD 30 MIN: CPT

## 2025-01-21 RX ORDER — GABAPENTIN 300 MG/1
300 CAPSULE ORAL DAILY
COMMUNITY

## 2025-01-21 RX ORDER — FLUCONAZOLE 150 MG/1
150 TABLET ORAL ONCE
Qty: 1 TABLET | Refills: 0 | Status: SHIPPED | OUTPATIENT
Start: 2025-01-21 | End: 2025-01-21

## 2025-01-21 ASSESSMENT — PATIENT HEALTH QUESTIONNAIRE - PHQ9
8. MOVING OR SPEAKING SO SLOWLY THAT OTHER PEOPLE COULD HAVE NOTICED. OR THE OPPOSITE, BEING SO FIGETY OR RESTLESS THAT YOU HAVE BEEN MOVING AROUND A LOT MORE THAN USUAL: NOT AT ALL
6. FEELING BAD ABOUT YOURSELF - OR THAT YOU ARE A FAILURE OR HAVE LET YOURSELF OR YOUR FAMILY DOWN: MORE THAN HALF THE DAYS
SUM OF ALL RESPONSES TO PHQ9 QUESTIONS 1 & 2: 4
2. FEELING DOWN, DEPRESSED OR HOPELESS: MORE THAN HALF THE DAYS
SUM OF ALL RESPONSES TO PHQ QUESTIONS 1-9: 16
SUM OF ALL RESPONSES TO PHQ QUESTIONS 1-9: 16
10. IF YOU CHECKED OFF ANY PROBLEMS, HOW DIFFICULT HAVE THESE PROBLEMS MADE IT FOR YOU TO DO YOUR WORK, TAKE CARE OF THINGS AT HOME, OR GET ALONG WITH OTHER PEOPLE: VERY DIFFICULT
SUM OF ALL RESPONSES TO PHQ QUESTIONS 1-9: 15
1. LITTLE INTEREST OR PLEASURE IN DOING THINGS: MORE THAN HALF THE DAYS
SUM OF ALL RESPONSES TO PHQ QUESTIONS 1-9: 16
3. TROUBLE FALLING OR STAYING ASLEEP: MORE THAN HALF THE DAYS
7. TROUBLE CONCENTRATING ON THINGS, SUCH AS READING THE NEWSPAPER OR WATCHING TELEVISION: NEARLY EVERY DAY
5. POOR APPETITE OR OVEREATING: SEVERAL DAYS
9. THOUGHTS THAT YOU WOULD BE BETTER OFF DEAD, OR OF HURTING YOURSELF: SEVERAL DAYS
4. FEELING TIRED OR HAVING LITTLE ENERGY: NEARLY EVERY DAY

## 2025-01-21 ASSESSMENT — ENCOUNTER SYMPTOMS
VOMITING: 0
NAUSEA: 0
ABDOMINAL PAIN: 0
DIARRHEA: 1
CONSTIPATION: 0
BACK PAIN: 1

## 2025-01-21 ASSESSMENT — COLUMBIA-SUICIDE SEVERITY RATING SCALE - C-SSRS
1. WITHIN THE PAST MONTH, HAVE YOU WISHED YOU WERE DEAD OR WISHED YOU COULD GO TO SLEEP AND NOT WAKE UP?: NO
6. HAVE YOU EVER DONE ANYTHING, STARTED TO DO ANYTHING, OR PREPARED TO DO ANYTHING TO END YOUR LIFE?: NO
2. HAVE YOU ACTUALLY HAD ANY THOUGHTS OF KILLING YOURSELF?: NO

## 2025-01-21 NOTE — PROGRESS NOTES
Tachycardia present.      Heart sounds: No murmur heard.     No friction rub. No gallop.   Pulmonary:      Effort: Pulmonary effort is normal.      Breath sounds: No wheezing, rhonchi or rales.   Abdominal:      General: Abdomen is flat. Bowel sounds are normal. There is no distension.      Palpations: Abdomen is soft.      Tenderness: There is no abdominal tenderness.   Musculoskeletal:      Right lower leg: No edema.      Left lower leg: No edema.   Skin:     General: Skin is warm and dry.   Neurological:      General: No focal deficit present.      Mental Status: She is alert and oriented to person, place, and time.   Psychiatric:         Behavior: Behavior normal.        Vitals:    01/21/25 1612   Pulse: (!) 123   Resp: 18   Temp: 97.9 °F (36.6 °C)   TempSrc: Temporal   SpO2: 95%   Weight: 131.1 kg (289 lb)   Height: 1.651 m (5' 5\")        Advised her to call back or return to office if symptoms worsen/change/persist.  Discussed expected course/resolution/complications of diagnosis in detail with patient.  Medication risks/benefits/costs/interactions/alternatives discussed with patient.    Kike Lincoln MD

## 2025-01-22 ENCOUNTER — OFFICE VISIT (OUTPATIENT)
Age: 30
End: 2025-01-22

## 2025-01-22 VITALS
DIASTOLIC BLOOD PRESSURE: 82 MMHG | WEIGHT: 290.7 LBS | SYSTOLIC BLOOD PRESSURE: 126 MMHG | BODY MASS INDEX: 48.43 KG/M2 | RESPIRATION RATE: 16 BRPM | HEIGHT: 65 IN | OXYGEN SATURATION: 96 % | HEART RATE: 108 BPM

## 2025-01-22 DIAGNOSIS — N64.3 GALACTORRHEA: ICD-10-CM

## 2025-01-22 DIAGNOSIS — E28.2 PCOS (POLYCYSTIC OVARIAN SYNDROME): ICD-10-CM

## 2025-01-22 RX ORDER — DOXYCYCLINE 100 MG/1
100 CAPSULE ORAL DAILY
COMMUNITY
Start: 2025-01-03 | End: 2025-01-22 | Stop reason: SDUPTHER

## 2025-01-22 NOTE — PROGRESS NOTES
Chief Complaint   Patient presents with    Weight Management    Polycystic Ovarian Syndrome    Discuss Medications     History of Present Illness: Mandy Fuentes is a 29 y.o. female with a past medical history significant for POTS, Crohn's disease, PCOS seen for discussion related to PCOS.    Previously very intolerant to several different OCP due to heavy menses with this.  Is considering having a total abdominal hysterectomy due to inability to control frequency and heaviness of menses.  Has previously had 3 miscarriages, currently discussing options with OB/GYN.  Currently taking metformin 1000 mg twice a day.  Received a prior authorization for Wegovy from PCP but is unable to obtain the for several doses.  There is no history of pancreatitis or heavy alcohol use.  Primary concerns are weight loss, acne, hirsutism.  Cousin does have nonclassical CAH and they are wondering if this is related to her POTS.  17 hydroxyprogesterone was previously normal.    05/03/2024: Took samples for ozempic but insurance would not cover wegovy because she was not in comprehensive nutrition program. Stopped OCP as it caused emotional lability. Attempted to get pregnant more recently- had surgery on vaginal/uterine septum. May be able to have IUD due to this procedure. Fallopian tubes are patent according to pt.    09/03/2024: Had surgical manipulation of vaginal and uterine septum, did require IV antibiotics postoperatively.  Is experiencing significant back pain for which a steroid is planned in the coming days.  Both she and her mother are very suspicious about excess cortisol.  Does have dorsal cervical adiposity.  No recent exogenous steroids.  Is taking norethindrone and metformin 1000 twice a day.  Unable to get GLP-1 agonist covered.    10/15/2024: 3 weeks of galactorrhea- >1tbsp from both sides- started nusynta 1 week ago for pelvic and back pain. Having surgery on December 13th. Knot on the breast- R side. Cecilio Bynum

## 2025-01-26 LAB
APPEARANCE UR: CLEAR
BACTERIA #/AREA URNS HPF: ABNORMAL /[HPF]
BACTERIA UR CULT: NORMAL
BASOPHILS # BLD AUTO: 0.1 X10E3/UL (ref 0–0.2)
BASOPHILS NFR BLD AUTO: 1 %
BILIRUB UR QL STRIP: NEGATIVE
BUN SERPL-MCNC: 8 MG/DL (ref 6–20)
BUN/CREAT SERPL: 10 (ref 9–23)
CALCIUM SERPL-MCNC: 10 MG/DL (ref 8.7–10.2)
CASTS URNS QL MICRO: ABNORMAL /LPF
CHLORIDE SERPL-SCNC: 100 MMOL/L (ref 96–106)
CO2 SERPL-SCNC: 23 MMOL/L (ref 20–29)
COLOR UR: YELLOW
CREAT SERPL-MCNC: 0.82 MG/DL (ref 0.57–1)
EGFRCR SERPLBLD CKD-EPI 2021: 100 ML/MIN/1.73
EOSINOPHIL # BLD AUTO: 0.3 X10E3/UL (ref 0–0.4)
EOSINOPHIL NFR BLD AUTO: 2 %
EPI CELLS #/AREA URNS HPF: >10 /HPF (ref 0–10)
ERYTHROCYTE [DISTWIDTH] IN BLOOD BY AUTOMATED COUNT: 13.3 % (ref 11.7–15.4)
GLUCOSE SERPL-MCNC: 92 MG/DL (ref 70–99)
GLUCOSE UR QL STRIP: NEGATIVE
HCT VFR BLD AUTO: 42.2 % (ref 34–46.6)
HGB BLD-MCNC: 13.3 G/DL (ref 11.1–15.9)
HGB UR QL STRIP: NEGATIVE
IMM GRANULOCYTES # BLD AUTO: 0.1 X10E3/UL (ref 0–0.1)
IMM GRANULOCYTES NFR BLD AUTO: 1 %
KETONES UR QL STRIP: NEGATIVE
LEUKOCYTE ESTERASE UR QL STRIP: ABNORMAL
LYMPHOCYTES # BLD AUTO: 4 X10E3/UL (ref 0.7–3.1)
LYMPHOCYTES NFR BLD AUTO: 39 %
MCH RBC QN AUTO: 27.6 PG (ref 26.6–33)
MCHC RBC AUTO-ENTMCNC: 31.5 G/DL (ref 31.5–35.7)
MCV RBC AUTO: 88 FL (ref 79–97)
MICRO URNS: ABNORMAL
MONOCYTES # BLD AUTO: 0.9 X10E3/UL (ref 0.1–0.9)
MONOCYTES NFR BLD AUTO: 9 %
NEUTROPHILS # BLD AUTO: 5.1 X10E3/UL (ref 1.4–7)
NEUTROPHILS NFR BLD AUTO: 48 %
NITRITE UR QL STRIP: NEGATIVE
PH UR STRIP: 6.5 [PH] (ref 5–7.5)
PLATELET # BLD AUTO: 406 X10E3/UL (ref 150–450)
POTASSIUM SERPL-SCNC: 4.6 MMOL/L (ref 3.5–5.2)
PROT UR QL STRIP: NEGATIVE
RBC # BLD AUTO: 4.82 X10E6/UL (ref 3.77–5.28)
RBC #/AREA URNS HPF: ABNORMAL /HPF (ref 0–2)
SODIUM SERPL-SCNC: 141 MMOL/L (ref 134–144)
SP GR UR STRIP: 1.01 (ref 1–1.03)
URINALYSIS REFLEX: ABNORMAL
UROBILINOGEN UR STRIP-MCNC: 0.2 MG/DL (ref 0.2–1)
WBC # BLD AUTO: 10.4 X10E3/UL (ref 3.4–10.8)
WBC #/AREA URNS HPF: ABNORMAL /HPF (ref 0–5)

## 2025-01-27 ENCOUNTER — OFFICE VISIT (OUTPATIENT)
Age: 30
End: 2025-01-27
Payer: MEDICAID

## 2025-01-27 ENCOUNTER — TELEPHONE (OUTPATIENT)
Age: 30
End: 2025-01-27

## 2025-01-27 VITALS
HEIGHT: 65 IN | HEART RATE: 113 BPM | OXYGEN SATURATION: 95 % | SYSTOLIC BLOOD PRESSURE: 112 MMHG | RESPIRATION RATE: 16 BRPM | TEMPERATURE: 97.9 F | DIASTOLIC BLOOD PRESSURE: 80 MMHG | BODY MASS INDEX: 47.62 KG/M2 | WEIGHT: 285.8 LBS

## 2025-01-27 DIAGNOSIS — E66.01 CLASS 3 SEVERE OBESITY DUE TO EXCESS CALORIES WITH SERIOUS COMORBIDITY AND BODY MASS INDEX (BMI) OF 45.0 TO 49.9 IN ADULT: Primary | ICD-10-CM

## 2025-01-27 DIAGNOSIS — R94.5 ABNORMAL LIVER FUNCTION: ICD-10-CM

## 2025-01-27 DIAGNOSIS — G93.2 IIH (IDIOPATHIC INTRACRANIAL HYPERTENSION): ICD-10-CM

## 2025-01-27 DIAGNOSIS — G90.A POTS (POSTURAL ORTHOSTATIC TACHYCARDIA SYNDROME): ICD-10-CM

## 2025-01-27 DIAGNOSIS — E28.2 PCOS (POLYCYSTIC OVARIAN SYNDROME): ICD-10-CM

## 2025-01-27 DIAGNOSIS — E66.813 CLASS 3 SEVERE OBESITY DUE TO EXCESS CALORIES WITH SERIOUS COMORBIDITY AND BODY MASS INDEX (BMI) OF 45.0 TO 49.9 IN ADULT: Primary | ICD-10-CM

## 2025-01-27 PROCEDURE — 99214 OFFICE O/P EST MOD 30 MIN: CPT | Performed by: FAMILY MEDICINE

## 2025-01-27 ASSESSMENT — PATIENT HEALTH QUESTIONNAIRE - PHQ9
2. FEELING DOWN, DEPRESSED OR HOPELESS: NOT AT ALL
1. LITTLE INTEREST OR PLEASURE IN DOING THINGS: NOT AT ALL
3. TROUBLE FALLING OR STAYING ASLEEP: NOT AT ALL
8. MOVING OR SPEAKING SO SLOWLY THAT OTHER PEOPLE COULD HAVE NOTICED. OR THE OPPOSITE, BEING SO FIGETY OR RESTLESS THAT YOU HAVE BEEN MOVING AROUND A LOT MORE THAN USUAL: NOT AT ALL
SUM OF ALL RESPONSES TO PHQ QUESTIONS 1-9: 0
5. POOR APPETITE OR OVEREATING: NOT AT ALL
SUM OF ALL RESPONSES TO PHQ9 QUESTIONS 1 & 2: 0
9. THOUGHTS THAT YOU WOULD BE BETTER OFF DEAD, OR OF HURTING YOURSELF: NOT AT ALL
SUM OF ALL RESPONSES TO PHQ QUESTIONS 1-9: 0
SUM OF ALL RESPONSES TO PHQ QUESTIONS 1-9: 0
6. FEELING BAD ABOUT YOURSELF - OR THAT YOU ARE A FAILURE OR HAVE LET YOURSELF OR YOUR FAMILY DOWN: NOT AT ALL
4. FEELING TIRED OR HAVING LITTLE ENERGY: NOT AT ALL
7. TROUBLE CONCENTRATING ON THINGS, SUCH AS READING THE NEWSPAPER OR WATCHING TELEVISION: NOT AT ALL
SUM OF ALL RESPONSES TO PHQ QUESTIONS 1-9: 0

## 2025-01-27 NOTE — PROGRESS NOTES
Weight Loss Progress Note: follow up Physician Visit      Mandy Fuentes is a 29 y.o. female  who is here for her follow up  Evaluation for the medical bariatric care.      CC: I want to be healthier  Has been exercising now  Taking 0.25 mg per week  of wegovy    Weight History  Current weight 285( 290) and BMI is Body mass index is 47.56 kg/m².  Goal weight BMI 29,   Highest weight 290   (See weight gain time line scanned into media section of chart)    Hunger control: better    Significant Medical History    Update on sleep apnea and  CPAP 8-10    Ever had bariatric surgery: no    Pregnant or planning on becoming pregnant w/in 6 months: no         Significant Psychosocial History     Current Major Lifestyle Changes: no  Any potential unsupportive people: no          Social History  Social History     Tobacco Use    Smoking status: Never     Passive exposure: Past    Smokeless tobacco: Former   Substance Use Topics    Alcohol use: Not Currently     Comment: occasionally every 6 month  - glass of wine     How many times a week do you eat out?  0-1    Do you drink any EtOH?  no   If so, how much?        Do you have upcoming any travel in the next 6 weeks?  no   If so, what do you have planned?          Exercise  How many days a week do you currently exercise?  5 days stretching and calesthetics  Have you ever been told by a physician not to exercise?  no      Objective  /80 (Site: Left Lower Arm, Position: Sitting, Cuff Size: Large Adult)   Pulse (!) 113   Temp 97.9 °F (36.6 °C) (Oral)   Resp 16   Ht 1.651 m (5' 5\")   Wt 129.6 kg (285 lb 12.8 oz)   LMP  (LMP Unknown)   SpO2 95%   BMI 47.56 kg/m²   Current Outpatient Medications   Medication Sig Dispense Refill    Semaglutide-Weight Management (WEGOVY) 0.5 MG/0.5ML SOAJ SC injection Inject 0.5 mg into the skin every 7 days 2 mL 0    gabapentin (NEURONTIN) 300 MG capsule Take 1 capsule by mouth daily.      AIMOVIG 140 MG/ML SOAJ INJECT 1 SYRINGE

## 2025-01-27 NOTE — TELEPHONE ENCOUNTER
Reason for call:  Spoke with pt. Pt requested a call back from nurse in regards her lab result.     Is this a new problem: Yes    Date of last appointment:  1/21/2025     Can we respond via i-markert: No    Best call back number: Mandy Fuentes   [phone]

## 2025-01-27 NOTE — PROGRESS NOTES
Identified pt with two pt identifiers (name and ). Reviewed chart in preparation for visit and have obtained necessary documentation.    Mandy Fuentes is a 29 y.o. female  Chief Complaint   Patient presents with    Weight Management     WLC/ 1 month      /80 (Site: Left Lower Arm, Position: Sitting, Cuff Size: Large Adult)   Pulse (!) 113   Temp 97.9 °F (36.6 °C) (Oral)   Resp 16   Ht 1.651 m (5' 5\")   Wt 129.6 kg (285 lb 12.8 oz)   LMP  (LMP Unknown)   SpO2 95%   BMI 47.56 kg/m²     1. Have you been to the ER, urgent care clinic since your last visit?  Hospitalized since your last visit?no    2. Have you seen or consulted any other health care providers outside of the [facility] since your last visit?  Include any pap smears or colon screening. [facility] cardiology - dec    Patient attended triage but did not bring homework form. Patient instructed to email or fax completed homework form to us. Patient informed that not bringing the homework form can result in not being seen next time.

## 2025-01-29 DIAGNOSIS — N39.0 RECURRENT UTI: Primary | ICD-10-CM

## 2025-01-30 NOTE — TELEPHONE ENCOUNTER
Called patient two identifiers used authenticated, confirmed that she read Dr Lincoln notes on lab results, patient confirmed that she did and verbalized understanding, did not voiced any other concerns.        Thank You   Edith Cowart LPN.

## 2025-02-26 ENCOUNTER — TELEPHONE (OUTPATIENT)
Age: 30
End: 2025-02-26

## 2025-02-26 NOTE — TELEPHONE ENCOUNTER
Patient requesting last two office notes/labs faxed to Dr.Peter Marie her new rheumatologist [phone]

## 2025-02-28 ENCOUNTER — TELEPHONE (OUTPATIENT)
Age: 30
End: 2025-02-28

## 2025-02-28 ENCOUNTER — PATIENT MESSAGE (OUTPATIENT)
Age: 30
End: 2025-02-28

## 2025-02-28 DIAGNOSIS — E66.813 CLASS 3 SEVERE OBESITY DUE TO EXCESS CALORIES WITH SERIOUS COMORBIDITY AND BODY MASS INDEX (BMI) OF 45.0 TO 49.9 IN ADULT: Primary | ICD-10-CM

## 2025-02-28 DIAGNOSIS — E66.01 CLASS 3 SEVERE OBESITY DUE TO EXCESS CALORIES WITH SERIOUS COMORBIDITY AND BODY MASS INDEX (BMI) OF 45.0 TO 49.9 IN ADULT: Primary | ICD-10-CM

## 2025-02-28 NOTE — TELEPHONE ENCOUNTER
Pt called but went to voicemail with her name attached. Left detailed message that Dr. Taylor did review the message and that she has responded to her via [facility].     She can reach the office with any questions.

## 2025-02-28 NOTE — TELEPHONE ENCOUNTER
Patient stated the medication this month her .5 Wegovy is making her pass out and have low blood pressure. Patient would like a call back in regards to medication today. Patient stated she doesn't want to take her wegovy shot that's scheduled for today if she is having these side effects.

## 2025-02-28 NOTE — TELEPHONE ENCOUNTER
I see that you take clonidine. That is very strong and lowers blood pressure significantly  Whoever is that prescriber you should talk to them about the drop in BP. I would suggest at least dropping that to half of what you normally take while waiting to talk to that prescriber.  Stay well hydrated.   Your CARRERA is likely contributing to this as well.   Also, whenever our weight drops the BP does also so your meds will need adjusting as you lose weight.    If this gets worse or if you continue to feel like you are going to pass out go to the ER.    Call Monday to let us know how you are doing.  MINESH Taylor MD

## 2025-02-28 NOTE — TELEPHONE ENCOUNTER
Returned call to patient and verified using 2 patient identifiers.  She states since she has been advanced to 0.5 mg she has noticed a drop in her BP and she feel like she wants to pass out.  She states that she is going into her 4 week of the Wegovy 0.5 mg. She is due to take her last injection tonight but does not want to take it with these side effects.  She states that her BP was 70/65. She has a history if CARRERA but she does not think it is related to that.   Pt was informed that she should not take the injection if she has been feeling this way.  She was advised to contact her PCP or go to an urgent care regarding her BP being low.    I will forward this information to Dr. Taylor to make her aware of this call and if there are any other recommendations I will follow up with her. Pt voiced understandings.

## 2025-03-07 ENCOUNTER — TELEPHONE (OUTPATIENT)
Age: 30
End: 2025-03-07

## 2025-03-07 NOTE — TELEPHONE ENCOUNTER
POTS likely complicatig the escalation of the wegovy  I am lowering the dose to 0.25 mg to take one more month at that dose and then retry the 0.5 mg dose

## 2025-03-10 ENCOUNTER — OFFICE VISIT (OUTPATIENT)
Age: 30
End: 2025-03-10
Payer: MEDICAID

## 2025-03-10 VITALS
RESPIRATION RATE: 16 BRPM | HEART RATE: 101 BPM | WEIGHT: 284.3 LBS | BODY MASS INDEX: 47.37 KG/M2 | TEMPERATURE: 97.4 F | DIASTOLIC BLOOD PRESSURE: 82 MMHG | OXYGEN SATURATION: 96 % | SYSTOLIC BLOOD PRESSURE: 134 MMHG | HEIGHT: 65 IN

## 2025-03-10 DIAGNOSIS — R94.5 ABNORMAL LIVER FUNCTION: ICD-10-CM

## 2025-03-10 DIAGNOSIS — E28.2 PCOS (POLYCYSTIC OVARIAN SYNDROME): ICD-10-CM

## 2025-03-10 DIAGNOSIS — E66.813 CLASS 3 SEVERE OBESITY DUE TO EXCESS CALORIES WITH SERIOUS COMORBIDITY AND BODY MASS INDEX (BMI) OF 45.0 TO 49.9 IN ADULT: Primary | ICD-10-CM

## 2025-03-10 DIAGNOSIS — G93.2 IIH (IDIOPATHIC INTRACRANIAL HYPERTENSION): ICD-10-CM

## 2025-03-10 DIAGNOSIS — G90.A POTS (POSTURAL ORTHOSTATIC TACHYCARDIA SYNDROME): ICD-10-CM

## 2025-03-10 DIAGNOSIS — E66.01 CLASS 3 SEVERE OBESITY DUE TO EXCESS CALORIES WITH SERIOUS COMORBIDITY AND BODY MASS INDEX (BMI) OF 45.0 TO 49.9 IN ADULT: Primary | ICD-10-CM

## 2025-03-10 PROCEDURE — 99214 OFFICE O/P EST MOD 30 MIN: CPT | Performed by: FAMILY MEDICINE

## 2025-03-10 ASSESSMENT — PATIENT HEALTH QUESTIONNAIRE - PHQ9
8. MOVING OR SPEAKING SO SLOWLY THAT OTHER PEOPLE COULD HAVE NOTICED. OR THE OPPOSITE, BEING SO FIGETY OR RESTLESS THAT YOU HAVE BEEN MOVING AROUND A LOT MORE THAN USUAL: NOT AT ALL
6. FEELING BAD ABOUT YOURSELF - OR THAT YOU ARE A FAILURE OR HAVE LET YOURSELF OR YOUR FAMILY DOWN: NOT AT ALL
2. FEELING DOWN, DEPRESSED OR HOPELESS: NOT AT ALL
1. LITTLE INTEREST OR PLEASURE IN DOING THINGS: NOT AT ALL
SUM OF ALL RESPONSES TO PHQ QUESTIONS 1-9: 0
3. TROUBLE FALLING OR STAYING ASLEEP: NOT AT ALL
4. FEELING TIRED OR HAVING LITTLE ENERGY: NOT AT ALL
SUM OF ALL RESPONSES TO PHQ QUESTIONS 1-9: 0
SUM OF ALL RESPONSES TO PHQ QUESTIONS 1-9: 0
7. TROUBLE CONCENTRATING ON THINGS, SUCH AS READING THE NEWSPAPER OR WATCHING TELEVISION: NOT AT ALL
SUM OF ALL RESPONSES TO PHQ QUESTIONS 1-9: 0
5. POOR APPETITE OR OVEREATING: NOT AT ALL
9. THOUGHTS THAT YOU WOULD BE BETTER OFF DEAD, OR OF HURTING YOURSELF: NOT AT ALL

## 2025-03-10 NOTE — PROGRESS NOTES
Identified pt with two pt identifiers (name and ). Reviewed chart in preparation for visit and have obtained necessary documentation.    Mandy Fuentes is a 29 y.o. female  Chief Complaint   Patient presents with    Weight Management     WLC/  1 month      /82 (BP Site: Left Upper Arm, Patient Position: Sitting, BP Cuff Size: Large Adult)   Pulse (!) 101   Temp 97.4 °F (36.3 °C) (Oral)   Resp 16   Ht 1.651 m (5' 5\")   Wt 129 kg (284 lb 4.8 oz)   LMP  (LMP Unknown)   SpO2 96%   BMI 47.31 kg/m²     1. Have you been to the ER, urgent care clinic since your last visit?  Hospitalized since your last visit?no    2. Have you seen or consulted any other health care providers outside of the [facility] since your last visit?  Include any pap smears or colon screening. no    
no   Acanthosis Nigricans no   Neck:  No nodes, thyromegaly   Heart:  RRR without M/R/G  Lungs:  CTAB, no rhonchi, rales, or wheezes with good air exchange   Abdomen:  Non-tender, pos bowel sounds; hepatomegaly -   Ext:  No C/C/E        Assessment & Plan  Mandy Fuentes was seen today for Weight Management (WLC/  1 month )       1. Class 3 severe obesity due to excess calories with serious comorbidity and body mass index (BMI) of 45.0 to 49.9 in adult  Movemnt  limited by overheating due to pots and has a rapid pulse at rest  I encourage as tolerated- chair exercise     meds-    go back to 0.25 mg wegovy for the next month then try again the  Semaglutide-Weight Management (WEGOVY) 0.5 MG/0.5ML SOAJ SC injection; Inject 0.5 mg into the skin every 7 days, Disp-2 mL, R-0Normal     Eating plan 1200 norma max grocery food plan     Sleep under eval  2. POTS (postural orthostatic tachycardia syndrome)  Maintain fluids as prescribed by cardiology  3. PCOS (polycystic ovarian syndrome)  lifestyle change as directed for weight loss will help treat the pcos       4. IIH (idiopathic intracranial hypertension)  Weight loss is important  5. Abnormal liver function     Recheck next month      Based on his history and exam, Mandy Fuentes is a good candidate for the  [facility]     There are no Patient Instructions on file for this visit.         The primary encounter diagnosis was Class 3 severe obesity due to excess calories with serious comorbidity and body mass index (BMI) of 45.0 to 49.9 in adult. Diagnoses of POTS (postural orthostatic tachycardia syndrome), PCOS (polycystic ovarian syndrome), IIH (idiopathic intracranial hypertension), and Abnormal liver function were also pertinent to this visit.  The patient verbalizes understanding and agrees with the plan of care

## 2025-03-18 ENCOUNTER — CLINICAL DOCUMENTATION (OUTPATIENT)
Age: 30
End: 2025-03-18

## 2025-03-18 ENCOUNTER — TELEPHONE (OUTPATIENT)
Age: 30
End: 2025-03-18

## 2025-03-18 DIAGNOSIS — G90.A POTS (POSTURAL ORTHOSTATIC TACHYCARDIA SYNDROME): Primary | ICD-10-CM

## 2025-03-18 NOTE — TELEPHONE ENCOUNTER
Jeannette, care coordinator with [facility].  The patient has been approved for a shower chair, due to her diagnosis of POTS.    She is requesting that Dr. Lincoln fax an order and patient demographics to:    [facility]  [address] [place] [address]   [address]  Phone:  [phone]  Fax:  [phone]      Jeannette - [phone]

## 2025-03-18 NOTE — PROGRESS NOTES
Request for order for a shower chair was faxed today 03/18/2023    Request came from:    Jeannette, care coordinator with [facility].  The patient has been approved for a shower chair, due to her diagnosis of POTS.     She is requesting that Dr. Lincoln fax an order and patient demographics to:     [facility]  [address] [place] [address]   [address]  Phone:  [phone]  Fax:  [phone]        Jeannette - [phone]      Thank you   Edith Cowart LPN

## 2025-03-30 DIAGNOSIS — E28.2 PCOS (POLYCYSTIC OVARIAN SYNDROME): ICD-10-CM

## 2025-03-30 RX ORDER — METFORMIN HYDROCHLORIDE 500 MG/1
1000 TABLET, EXTENDED RELEASE ORAL
Qty: 360 TABLET | Refills: 3 | Status: SHIPPED | OUTPATIENT
Start: 2025-03-30

## 2025-04-08 ENCOUNTER — CLINICAL DOCUMENTATION (OUTPATIENT)
Age: 30
End: 2025-04-08

## 2025-04-08 NOTE — PROGRESS NOTES
Dr. Lincoln is okay with patient switching over to [facility] Martines, .     Thank you  Edith Cowart LPN

## 2025-04-18 LAB — IGF-I SERPL-MCNC: 281 NG/ML (ref 91–308)

## 2025-04-21 ENCOUNTER — RESULTS FOLLOW-UP (OUTPATIENT)
Age: 30
End: 2025-04-21

## 2025-04-23 ENCOUNTER — OFFICE VISIT (OUTPATIENT)
Age: 30
End: 2025-04-23
Payer: MEDICAID

## 2025-04-23 VITALS
DIASTOLIC BLOOD PRESSURE: 89 MMHG | SYSTOLIC BLOOD PRESSURE: 125 MMHG | HEART RATE: 100 BPM | HEIGHT: 65 IN | WEIGHT: 291 LBS | BODY MASS INDEX: 48.48 KG/M2 | OXYGEN SATURATION: 96 % | TEMPERATURE: 97.9 F | RESPIRATION RATE: 18 BRPM

## 2025-04-23 DIAGNOSIS — E28.2 PCOS (POLYCYSTIC OVARIAN SYNDROME): ICD-10-CM

## 2025-04-23 DIAGNOSIS — G93.2 IIH (IDIOPATHIC INTRACRANIAL HYPERTENSION): ICD-10-CM

## 2025-04-23 DIAGNOSIS — E66.813 CLASS 3 SEVERE OBESITY DUE TO EXCESS CALORIES WITH SERIOUS COMORBIDITY AND BODY MASS INDEX (BMI) OF 45.0 TO 49.9 IN ADULT (HCC): Primary | ICD-10-CM

## 2025-04-23 DIAGNOSIS — G90.A POTS (POSTURAL ORTHOSTATIC TACHYCARDIA SYNDROME): ICD-10-CM

## 2025-04-23 PROCEDURE — 99214 OFFICE O/P EST MOD 30 MIN: CPT | Performed by: FAMILY MEDICINE

## 2025-04-23 RX ORDER — DOXYCYCLINE 100 MG/1
100 CAPSULE ORAL 2 TIMES DAILY
COMMUNITY
Start: 2025-04-14

## 2025-04-23 RX ORDER — MELOXICAM 7.5 MG/1
7.5 TABLET ORAL DAILY
COMMUNITY
Start: 2025-04-15

## 2025-04-23 ASSESSMENT — PATIENT HEALTH QUESTIONNAIRE - PHQ9
7. TROUBLE CONCENTRATING ON THINGS, SUCH AS READING THE NEWSPAPER OR WATCHING TELEVISION: SEVERAL DAYS
SUM OF ALL RESPONSES TO PHQ QUESTIONS 1-9: 8
SUM OF ALL RESPONSES TO PHQ QUESTIONS 1-9: 7
2. FEELING DOWN, DEPRESSED OR HOPELESS: SEVERAL DAYS
6. FEELING BAD ABOUT YOURSELF - OR THAT YOU ARE A FAILURE OR HAVE LET YOURSELF OR YOUR FAMILY DOWN: SEVERAL DAYS
8. MOVING OR SPEAKING SO SLOWLY THAT OTHER PEOPLE COULD HAVE NOTICED. OR THE OPPOSITE, BEING SO FIGETY OR RESTLESS THAT YOU HAVE BEEN MOVING AROUND A LOT MORE THAN USUAL: SEVERAL DAYS
3. TROUBLE FALLING OR STAYING ASLEEP: SEVERAL DAYS
SUM OF ALL RESPONSES TO PHQ QUESTIONS 1-9: 8
9. THOUGHTS THAT YOU WOULD BE BETTER OFF DEAD, OR OF HURTING YOURSELF: SEVERAL DAYS
4. FEELING TIRED OR HAVING LITTLE ENERGY: SEVERAL DAYS
1. LITTLE INTEREST OR PLEASURE IN DOING THINGS: SEVERAL DAYS
SUM OF ALL RESPONSES TO PHQ QUESTIONS 1-9: 8
5. POOR APPETITE OR OVEREATING: NOT AT ALL

## 2025-04-23 NOTE — PROGRESS NOTES
Identified pt with two pt identifiers (name and ). Reviewed chart in preparation for visit and have obtained necessary documentation.    Mandy Fuentes is a 29 y.o. female  Chief Complaint   Patient presents with    Weight Management     WLC/  1 month      /89 (BP Site: Right Lower Arm, Patient Position: Sitting, BP Cuff Size: Small Adult)   Pulse 100   Temp 97.9 °F (36.6 °C) (Oral)   Resp 18   Ht 1.651 m (5' 5\")   Wt 132 kg (291 lb)   LMP  (LMP Unknown)   SpO2 96%   BMI 48.42 kg/m²     1. Have you been to the ER, urgent care clinic since your last visit?  Hospitalized since your last visit?no    2. Have you seen or consulted any other health care providers outside of the [facility] since your last visit?  Include any pap smears or colon screening. yes - POTS DR. Haile Carpenter Cardio.   [facility]- rheumatology     Patient having dizzy spells and passing out today

## 2025-04-23 NOTE — PROGRESS NOTES
Weight Loss Progress Note: follow up Physician Visit      Mandy Fuentes is a 29 y.o. female  who is here for her follow up  Evaluation for the medical bariatric care.      CC: I want to be healthier  She was taking wegovy 0.5 mg a week  I reduced it to 0.25 based on the suggestion of the POTS doc  The 0.5 mg caused more syncope  Right now on none but has had 3 episodes today    Her cardiologist is now sending a ref to cardiac rehab      Weight History  Current weight 291 (281)and BMI is Body mass index is 48.42 kg/m².  Goal weight BMI 29,   Highest weight 291   (See weight gain time line scanned into media section of chart)    Hunger control: better    Significant Medical History    Update on sleep apnea and  CPAP 8-10    Ever had bariatric surgery: no    Pregnant or planning on becoming pregnant w/in 6 months: no         Significant Psychosocial History     Current Major Lifestyle Changes: no  Any potential unsupportive people: no          Social History  Social History     Tobacco Use    Smoking status: Never     Passive exposure: Past    Smokeless tobacco: Former   Substance Use Topics    Alcohol use: Not Currently     Comment: occasionally every 6 month  - glass of wine     How many times a week do you eat out?  0-1    Do you drink any EtOH?  no   If so, how much?        Do you have upcoming any travel in the next 6 weeks?  no   If so, what do you have planned?          Exercise  How many days a week do you currently exercise?  0 days  Have you ever been told by a physician not to exercise?  no      Objective  /89 (BP Site: Right Lower Arm, Patient Position: Sitting, BP Cuff Size: Small Adult)   Pulse 100   Temp 97.9 °F (36.6 °C) (Oral)   Resp 18   Ht 1.651 m (5' 5\")   Wt 132 kg (291 lb)   LMP  (LMP Unknown)   SpO2 96%   BMI 48.42 kg/m²   Current Outpatient Medications   Medication Sig Dispense Refill    doxycycline monohydrate (MONODOX) 100 MG capsule Take 1 capsule by mouth 2 times daily

## 2025-04-29 ENCOUNTER — TELEPHONE (OUTPATIENT)
Age: 30
End: 2025-04-29

## 2025-04-30 ENCOUNTER — TELEPHONE (OUTPATIENT)
Age: 30
End: 2025-04-30

## 2025-05-14 DIAGNOSIS — E66.813 CLASS 3 SEVERE OBESITY DUE TO EXCESS CALORIES WITH SERIOUS COMORBIDITY AND BODY MASS INDEX (BMI) OF 45.0 TO 49.9 IN ADULT (HCC): Primary | ICD-10-CM

## 2025-05-14 NOTE — PROGRESS NOTES
She is having Diarrhea due to wegovy which is dehydrating her and making her POTS worse  Zepbound usually has less side effect from GI track. I will send rx for that and stop the wegovy  She will follow up in a few weeks   MINESH Taylor

## 2025-05-19 ENCOUNTER — OFFICE VISIT (OUTPATIENT)
Age: 30
End: 2025-05-19
Payer: MEDICAID

## 2025-05-19 VITALS
DIASTOLIC BLOOD PRESSURE: 90 MMHG | SYSTOLIC BLOOD PRESSURE: 127 MMHG | RESPIRATION RATE: 20 BRPM | WEIGHT: 292.4 LBS | HEART RATE: 100 BPM | TEMPERATURE: 98.3 F | BODY MASS INDEX: 48.72 KG/M2 | HEIGHT: 65 IN | OXYGEN SATURATION: 96 %

## 2025-05-19 DIAGNOSIS — R94.5 ABNORMAL LIVER FUNCTION: ICD-10-CM

## 2025-05-19 DIAGNOSIS — E66.813 CLASS 3 SEVERE OBESITY DUE TO EXCESS CALORIES WITH SERIOUS COMORBIDITY AND BODY MASS INDEX (BMI) OF 45.0 TO 49.9 IN ADULT (HCC): Primary | ICD-10-CM

## 2025-05-19 DIAGNOSIS — E28.2 PCOS (POLYCYSTIC OVARIAN SYNDROME): ICD-10-CM

## 2025-05-19 DIAGNOSIS — G90.A POTS (POSTURAL ORTHOSTATIC TACHYCARDIA SYNDROME): ICD-10-CM

## 2025-05-19 DIAGNOSIS — G93.2 IIH (IDIOPATHIC INTRACRANIAL HYPERTENSION): ICD-10-CM

## 2025-05-19 PROCEDURE — 99214 OFFICE O/P EST MOD 30 MIN: CPT | Performed by: FAMILY MEDICINE

## 2025-05-19 RX ORDER — PYRIDOSTIGMINE BROMIDE 30 MG/1
1 TABLET ORAL 3 TIMES DAILY
COMMUNITY
Start: 2025-04-28

## 2025-05-19 RX ORDER — IVABRADINE 5 MG/1
5 TABLET, FILM COATED ORAL 2 TIMES DAILY WITH MEALS
COMMUNITY
Start: 2025-02-19

## 2025-05-19 RX ORDER — METHYLPREDNISOLONE 4 MG/1
4 TABLET ORAL SEE ADMIN INSTRUCTIONS
COMMUNITY
Start: 2025-05-13

## 2025-05-19 RX ORDER — PREDNISONE 10 MG/1
20 TABLET ORAL AS NEEDED
COMMUNITY
Start: 2025-05-13

## 2025-05-19 RX ORDER — CYCLOBENZAPRINE HCL 10 MG
10 TABLET ORAL 2 TIMES DAILY PRN
COMMUNITY
Start: 2025-05-14

## 2025-05-19 RX ORDER — CLINDAMYCIN PHOSPHATE 10 MG/G
GEL TOPICAL 2 TIMES DAILY
COMMUNITY
Start: 2025-02-11

## 2025-05-19 RX ORDER — VALACYCLOVIR HYDROCHLORIDE 1 G/1
1 TABLET, FILM COATED ORAL 2 TIMES DAILY
COMMUNITY

## 2025-05-19 ASSESSMENT — PATIENT HEALTH QUESTIONNAIRE - PHQ9
10. IF YOU CHECKED OFF ANY PROBLEMS, HOW DIFFICULT HAVE THESE PROBLEMS MADE IT FOR YOU TO DO YOUR WORK, TAKE CARE OF THINGS AT HOME, OR GET ALONG WITH OTHER PEOPLE: NOT DIFFICULT AT ALL
6. FEELING BAD ABOUT YOURSELF - OR THAT YOU ARE A FAILURE OR HAVE LET YOURSELF OR YOUR FAMILY DOWN: NOT AT ALL
SUM OF ALL RESPONSES TO PHQ QUESTIONS 1-9: 0
8. MOVING OR SPEAKING SO SLOWLY THAT OTHER PEOPLE COULD HAVE NOTICED. OR THE OPPOSITE, BEING SO FIGETY OR RESTLESS THAT YOU HAVE BEEN MOVING AROUND A LOT MORE THAN USUAL: NOT AT ALL
3. TROUBLE FALLING OR STAYING ASLEEP: NOT AT ALL
4. FEELING TIRED OR HAVING LITTLE ENERGY: NOT AT ALL
SUM OF ALL RESPONSES TO PHQ QUESTIONS 1-9: 0
2. FEELING DOWN, DEPRESSED OR HOPELESS: NOT AT ALL
1. LITTLE INTEREST OR PLEASURE IN DOING THINGS: NOT AT ALL
9. THOUGHTS THAT YOU WOULD BE BETTER OFF DEAD, OR OF HURTING YOURSELF: NOT AT ALL
SUM OF ALL RESPONSES TO PHQ QUESTIONS 1-9: 0
SUM OF ALL RESPONSES TO PHQ QUESTIONS 1-9: 0
7. TROUBLE CONCENTRATING ON THINGS, SUCH AS READING THE NEWSPAPER OR WATCHING TELEVISION: NOT AT ALL
5. POOR APPETITE OR OVEREATING: NOT AT ALL

## 2025-05-19 NOTE — PROGRESS NOTES
Identified pt with two pt identifiers (name and ). Reviewed chart in preparation for visit and have obtained necessary documentation.    Mandy Fuentes is a 29 y.o. female  Chief Complaint   Patient presents with    Weight Management     WLC/ 1 month      BP (!) 127/90 (BP Site: Right Lower Arm, Patient Position: Sitting, BP Cuff Size: Small Adult)   Pulse 100   Temp 98.3 °F (36.8 °C)   Resp 20   Ht 1.651 m (5' 5\")   Wt 132.6 kg (292 lb 6.4 oz) Comment: Bariatric scale not working, used bigger scale  LMP  (LMP Unknown)   SpO2 96%   BMI 48.66 kg/m²     1. Have you been to the ER, urgent care clinic since your last visit?  Hospitalized since your last visit? [facility] ER- last Thursday- [facility]    2. Have you seen or consulted any other health care providers outside of the [facility] since your last visit?  Include any pap smears or colon screening. no    Patient attended triage but did not bring homework form. Patient instructed to email or fax completed homework form to us. Patient informed that not bringing the homework form can result in not being seen next time.

## 2025-05-19 NOTE — PROGRESS NOTES
Weight Loss Progress Note: follow up Physician Visit      Mandy Fuentes is a 29 y.o. female  who is here for her follow up  Evaluation for the medical bariatric care.      CC: I want to be heaLthier  She did not tolerate wegovy  She had sudden elevations of her heart rate into the 130s resting while on it and would not come down for extended tmes  Went to ER from [facility] center due to this while on the wegovy  It was then discontinued  She was having diarrhea at that time as a side effect from the wegovy  She is not a candidate for phentermine or other stimulants due to her heart rate and not candidate for xenical due to potential loss of fluids in possible  more frequent stools     Weight History  Current weight 292( 291) and BMI is Body mass index is 48.66 kg/m².  Goal weight BMI 29,   Highest weight 292   (See weight gain time line scanned into media section of chart)    Hunger control: fair    Significant Medical History    Update on sleep apnea and  CPAP 8-10    Ever had bariatric surgery: no    Pregnant or planning on becoming pregnant w/in 6 months: no         Significant Psychosocial History     Current Major Lifestyle Changes: no  Any potential unsupportive people: no          Social History  Social History     Tobacco Use    Smoking status: Never     Passive exposure: Past    Smokeless tobacco: Former   Substance Use Topics    Alcohol use: Not Currently     Comment: occasionally every 6 month  - glass of wine     How many times a week do you eat out?  0-1    Do you drink any EtOH?  no   If so, how much?        Do you have upcoming any travel in the next 6 weeks?  no   If so, what do you have planned?          Exercise  How many days a week do you currently exercise?  0 days  Have you ever been told by a physician not to exercise?  no      Objective  BP (!) 127/90 (BP Site: Right Lower Arm, Patient Position: Sitting, BP Cuff Size: Small Adult)   Pulse 100   Temp 98.3 °F (36.8 °C)   Resp 20   Ht 1.651

## 2025-05-20 ENCOUNTER — CLINICAL DOCUMENTATION (OUTPATIENT)
Age: 30
End: 2025-05-20

## 2025-05-20 NOTE — PROGRESS NOTES
Prior auth for Zepbound returned approved for patient from 5/20/25 to 11/16/25.     PA # [id number]    Patient notified

## 2025-05-30 ENCOUNTER — OFFICE VISIT (OUTPATIENT)
Age: 30
End: 2025-05-30
Payer: MEDICAID

## 2025-05-30 VITALS
SYSTOLIC BLOOD PRESSURE: 123 MMHG | TEMPERATURE: 97.9 F | RESPIRATION RATE: 16 BRPM | BODY MASS INDEX: 48.82 KG/M2 | HEIGHT: 65 IN | DIASTOLIC BLOOD PRESSURE: 90 MMHG | WEIGHT: 293 LBS | HEART RATE: 101 BPM | OXYGEN SATURATION: 91 %

## 2025-05-30 DIAGNOSIS — K90.49 FOOD INTOLERANCE: Primary | ICD-10-CM

## 2025-05-30 DIAGNOSIS — F44.5 PSYCHOGENIC NONEPILEPTIC SEIZURE: ICD-10-CM

## 2025-05-30 DIAGNOSIS — E28.2 PCOS (POLYCYSTIC OVARIAN SYNDROME): ICD-10-CM

## 2025-05-30 DIAGNOSIS — G43.009 MIGRAINE WITHOUT AURA AND WITHOUT STATUS MIGRAINOSUS, NOT INTRACTABLE: ICD-10-CM

## 2025-05-30 DIAGNOSIS — M45.9 ANKYLOSING SPONDYLITIS, UNSPECIFIED SITE OF SPINE (HCC): ICD-10-CM

## 2025-05-30 DIAGNOSIS — G90.A POTS (POSTURAL ORTHOSTATIC TACHYCARDIA SYNDROME): ICD-10-CM

## 2025-05-30 DIAGNOSIS — K50.119 CROHN'S DISEASE OF COLON WITH COMPLICATION (HCC): ICD-10-CM

## 2025-05-30 DIAGNOSIS — F33.41 RECURRENT MAJOR DEPRESSIVE DISORDER, IN PARTIAL REMISSION: ICD-10-CM

## 2025-05-30 PROBLEM — K58.0 IRRITABLE BOWEL SYNDROME WITH DIARRHEA: Status: RESOLVED | Noted: 2024-10-10 | Resolved: 2025-05-30

## 2025-05-30 PROBLEM — L40.9 PSORIASIS: Status: RESOLVED | Noted: 2024-10-10 | Resolved: 2025-05-30

## 2025-05-30 PROBLEM — G93.2 IIH (IDIOPATHIC INTRACRANIAL HYPERTENSION): Status: RESOLVED | Noted: 2024-10-10 | Resolved: 2025-05-30

## 2025-05-30 PROBLEM — E66.9 ADIPOSITY: Status: RESOLVED | Noted: 2017-12-29 | Resolved: 2025-05-30

## 2025-05-30 PROCEDURE — 99215 OFFICE O/P EST HI 40 MIN: CPT | Performed by: STUDENT IN AN ORGANIZED HEALTH CARE EDUCATION/TRAINING PROGRAM

## 2025-05-30 NOTE — ASSESSMENT & PLAN NOTE
Various symptoms managed by specialists - Dr Plasencia (cardiology) and EP at [facility]. We discussed her various symptoms and concerns regarding management. I will send a referral to Dr Newby at [facility]'s dysautonomia clinic. We also discussed contacting [facility] and/or [facility] to see if they are able to see her despite her insurance being through [place] Medicaid.

## 2025-05-30 NOTE — PROGRESS NOTES
Verified name and birth date for privacy precautions.   Chart reviewed in preparation for today's visit.     Chief Complaint   Patient presents with    New Patient          Health Maintenance Due   Topic    Varicella vaccine (1 of 2 - 13+ 2-dose series)    DTaP/Tdap/Td vaccine (7 - Td or Tdap)    COVID-19 Vaccine (3 - 2024-25 season)         Wt Readings from Last 3 Encounters:   05/30/25 133.4 kg (294 lb)   05/19/25 132.6 kg (292 lb 6.4 oz)   04/23/25 132 kg (291 lb)     Temp Readings from Last 3 Encounters:   05/30/25 97.9 °F (36.6 °C)   05/19/25 98.3 °F (36.8 °C)   04/23/25 97.9 °F (36.6 °C) (Oral)     BP Readings from Last 3 Encounters:   05/30/25 (!) 123/90   05/19/25 (!) 127/90   04/23/25 125/89     Pulse Readings from Last 3 Encounters:   05/30/25 (!) 101   05/19/25 100   04/23/25 100       Social Drivers of Health     Tobacco Use: Medium Risk (5/30/2025)    Patient History     Smoking Tobacco Use: Never     Smokeless Tobacco Use: Former     Passive Exposure: Past   Alcohol Use: Not At Risk (12/20/2024)    AUDIT-C     Frequency of Alcohol Consumption: Never     Average Number of Drinks: Patient does not drink     Frequency of Binge Drinking: Never   Financial Resource Strain: Low Risk  (7/19/2024)    Overall Financial Resource Strain (CARDIA)     Difficulty of Paying Living Expenses: Not hard at all   Food Insecurity: No Food Insecurity (8/17/2024)    Hunger Vital Sign     Worried About Running Out of Food in the Last Year: Never true     Ran Out of Food in the Last Year: Never true   Transportation Needs: No Transportation Needs (8/17/2024)    PRAPARE - Transportation     Lack of Transportation (Medical): No     Lack of Transportation (Non-Medical): No   Physical Activity: Not on file   Stress: Not on file   Social Connections: Not on file   Intimate Partner Violence: Not on file   Depression: Not at risk (5/19/2025)    PHQ-2     PHQ-2 Score: 0   Recent Concern: Depression - At risk (4/23/2025)    PHQ-2

## 2025-05-30 NOTE — ASSESSMENT & PLAN NOTE
Monitored by specialist- no acute findings meriting change in the plan. Continue per Reji Wilkerson.

## 2025-05-30 NOTE — PROGRESS NOTES
Mandy Fuentes is a 29 y.o. year old female who is a new patient to me today. She was previously followed by Dr Lincoln.      Assessment & Plan:   1. Food intolerance  -     It does sound like she is having a true allergic reaction, referred to allergy for further evaluation.  - External Referral To Allergy  2. Ankylosing spondylitis, unspecified site of spine (HCC)  Assessment & Plan:  Monitored by specialist (Dr Marie) - no acute findings meriting change in the plan  3. POTS (postural orthostatic tachycardia syndrome)  Assessment & Plan:  Various symptoms managed by specialists - Dr Plasencia (cardiology) and EP at [facility]. We discussed her various symptoms and concerns regarding management. I will send a referral to Dr Newby at [facility]'s dysautonomia clinic. We also discussed contacting [facility] and/or [facility] to see if they are able to see her despite her insurance being through [place] Medicaid.  4. Crohn's disease of colon with complication (HCC)  Assessment & Plan:   Monitored by specialist- no acute findings meriting change in the plan. Continue per Reji Wilkerson.  5. PCOS (polycystic ovarian syndrome)  Assessment & Plan:  Recommend to follow up with OBGYN to discuss whether OCPs will be beneficial for her symptoms. I will take over prescribing her metformin for her as requested.  6. Migraine without aura and without status migrainosus, not intractable  Assessment & Plan:  Continue management per Dr Holley. Requested most recent neurology notes/labs.  7. Recurrent major depressive disorder, in partial remission  Assessment & Plan:   Monitored by specialist- no acute findings meriting change in the plan  8. Psychogenic nonepileptic seizure  Assessment & Plan:   Monitored by specialist- no acute findings meriting change in the plan        Return in about 1 year (around 5/30/2026) for Annual Physical.    History/Subjective   Mandy is here today to establish care, accompanied by

## 2025-05-30 NOTE — ASSESSMENT & PLAN NOTE
Recommend to follow up with OBGYN to discuss whether OCPs will be beneficial for her symptoms. I will take over prescribing her metformin for her as requested.

## 2025-06-16 ENCOUNTER — TELEPHONE (OUTPATIENT)
Age: 30
End: 2025-06-16

## 2025-06-16 NOTE — TELEPHONE ENCOUNTER
Please let her know she does not have diabetes. These are cholesterol levels that should be discussed with cardiology. AZALEA Walter    6/16/25 11:44 AM  Saba Walker LPN routed this conversation to Me (Selected Message)  Mandy Fuentes to [facility] Clinical Staff (supporting You)        6/13/25  1:14 PM  Couldn’t fit so here’s the rest    Attachments   [id number]  Mandy Fuentes to DONAL [facility] Clinical Staff (supporting You)        6/13/25  1:13 PM  I have looked under my [facility] drawings that my Cardiologist had taken. I attached what they came back and was wondering if this means I have diabetes or what is going on because I’m not being treated. Also I was wondering if it is insulin resistant would taking my ovaries help this even if I have to take a pill.      Please look at what I have attached and let me know what your suggestion is I also can’t take GLP-1 because it mess with my POTS.      Thank you   Mandy Fuentes    Attachments   [id number]     [id number]

## 2025-06-20 NOTE — TELEPHONE ENCOUNTER
I called MsPatrick Gina and spoke with her mom. I relayed the message from Dr. Solis and she understood this information.  Saba

## 2025-06-25 ENCOUNTER — CLINICAL DOCUMENTATION (OUTPATIENT)
Age: 30
End: 2025-06-25

## 2025-06-25 NOTE — PROGRESS NOTES
Fax sent requesting most recent office visit notes, lab results and imaging (echo) from [facility]  per pcp

## 2025-06-25 NOTE — PROGRESS NOTES
Fax sent requesting most recent office visit notes, lab results and imaging from [facility] per pcp

## 2025-06-25 NOTE — PROGRESS NOTES
Fax sent requesting most recent office visit notes and  lab results from Dr Holley (Neuro) per pcp

## 2025-06-25 NOTE — PROGRESS NOTES
Fax sent requesting most recent lab results, imaging and office visit notes from Dr Chinchilla (rheumatology) per pcp

## 2025-07-02 ENCOUNTER — TELEPHONE (OUTPATIENT)
Age: 30
End: 2025-07-02

## 2025-07-02 ENCOUNTER — OFFICE VISIT (OUTPATIENT)
Age: 30
End: 2025-07-02
Payer: MEDICAID

## 2025-07-02 ENCOUNTER — OFFICE VISIT (OUTPATIENT)
Age: 30
End: 2025-07-02

## 2025-07-02 VITALS
HEIGHT: 65 IN | SYSTOLIC BLOOD PRESSURE: 122 MMHG | WEIGHT: 293 LBS | HEART RATE: 100 BPM | DIASTOLIC BLOOD PRESSURE: 85 MMHG | OXYGEN SATURATION: 95 % | RESPIRATION RATE: 16 BRPM | BODY MASS INDEX: 48.82 KG/M2 | TEMPERATURE: 98 F

## 2025-07-02 VITALS
RESPIRATION RATE: 15 BRPM | WEIGHT: 293 LBS | HEIGHT: 65 IN | DIASTOLIC BLOOD PRESSURE: 91 MMHG | BODY MASS INDEX: 48.82 KG/M2 | TEMPERATURE: 97.3 F | OXYGEN SATURATION: 96 % | HEART RATE: 124 BPM | SYSTOLIC BLOOD PRESSURE: 129 MMHG

## 2025-07-02 DIAGNOSIS — Z13.29 SCREENING FOR HYPOTHYROIDISM: ICD-10-CM

## 2025-07-02 DIAGNOSIS — R09.81 NASAL CONGESTION: Primary | ICD-10-CM

## 2025-07-02 DIAGNOSIS — Z13.1 SCREENING FOR DIABETES MELLITUS: ICD-10-CM

## 2025-07-02 DIAGNOSIS — Z13.220 SCREENING FOR HYPERCHOLESTEROLEMIA: ICD-10-CM

## 2025-07-02 DIAGNOSIS — R94.5 ABNORMAL LIVER FUNCTION: ICD-10-CM

## 2025-07-02 DIAGNOSIS — E66.813 CLASS 3 SEVERE OBESITY DUE TO EXCESS CALORIES WITH SERIOUS COMORBIDITY AND BODY MASS INDEX (BMI) OF 50.0 TO 59.9 IN ADULT (HCC): Primary | ICD-10-CM

## 2025-07-02 DIAGNOSIS — G90.A POTS (POSTURAL ORTHOSTATIC TACHYCARDIA SYNDROME): ICD-10-CM

## 2025-07-02 DIAGNOSIS — E28.2 PCOS (POLYCYSTIC OVARIAN SYNDROME): ICD-10-CM

## 2025-07-02 DIAGNOSIS — G93.2 IIH (IDIOPATHIC INTRACRANIAL HYPERTENSION): ICD-10-CM

## 2025-07-02 LAB
EXP DATE SOLUTION: NORMAL
EXP DATE SWAB: NORMAL
EXPIRATION DATE: NORMAL
INFLUENZA A RNA, POC: NORMAL
INFLUENZA B RNA, POC: NORMAL
LOT NUMBER POC: NORMAL
LOT NUMBER SOLUTION: NORMAL
LOT NUMBER SWAB: NORMAL
SARS-COV-2 RNA, POC: NEGATIVE
VALID INTERNAL CONTROL: NORMAL

## 2025-07-02 PROCEDURE — 99213 OFFICE O/P EST LOW 20 MIN: CPT | Performed by: NURSE PRACTITIONER

## 2025-07-02 PROCEDURE — PBSHW AMB POC COVID-19 & INFLUENZA A/B: Performed by: NURSE PRACTITIONER

## 2025-07-02 PROCEDURE — 87636 SARSCOV2 & INF A&B AMP PRB: CPT | Performed by: NURSE PRACTITIONER

## 2025-07-02 RX ORDER — FLUTICASONE PROPIONATE 50 MCG
2 SPRAY, SUSPENSION (ML) NASAL DAILY
Qty: 16 G | Refills: 0 | Status: SHIPPED | OUTPATIENT
Start: 2025-07-02

## 2025-07-02 RX ORDER — ACYCLOVIR 400 MG/1
400 TABLET ORAL
COMMUNITY

## 2025-07-02 RX ORDER — LIRAGLUTIDE 6 MG/ML
INJECTION, SOLUTION SUBCUTANEOUS
Qty: 5 ADJUSTABLE DOSE PRE-FILLED PEN SYRINGE | Refills: 2 | Status: SHIPPED | OUTPATIENT
Start: 2025-07-02

## 2025-07-02 ASSESSMENT — PATIENT HEALTH QUESTIONNAIRE - PHQ9
1. LITTLE INTEREST OR PLEASURE IN DOING THINGS: NOT AT ALL
SUM OF ALL RESPONSES TO PHQ QUESTIONS 1-9: 0
5. POOR APPETITE OR OVEREATING: NOT AT ALL
SUM OF ALL RESPONSES TO PHQ QUESTIONS 1-9: 0
9. THOUGHTS THAT YOU WOULD BE BETTER OFF DEAD, OR OF HURTING YOURSELF: NOT AT ALL
7. TROUBLE CONCENTRATING ON THINGS, SUCH AS READING THE NEWSPAPER OR WATCHING TELEVISION: NOT AT ALL
4. FEELING TIRED OR HAVING LITTLE ENERGY: NOT AT ALL
6. FEELING BAD ABOUT YOURSELF - OR THAT YOU ARE A FAILURE OR HAVE LET YOURSELF OR YOUR FAMILY DOWN: NOT AT ALL
SUM OF ALL RESPONSES TO PHQ QUESTIONS 1-9: 0
3. TROUBLE FALLING OR STAYING ASLEEP: NOT AT ALL
8. MOVING OR SPEAKING SO SLOWLY THAT OTHER PEOPLE COULD HAVE NOTICED. OR THE OPPOSITE, BEING SO FIGETY OR RESTLESS THAT YOU HAVE BEEN MOVING AROUND A LOT MORE THAN USUAL: NOT AT ALL
2. FEELING DOWN, DEPRESSED OR HOPELESS: NOT AT ALL
SUM OF ALL RESPONSES TO PHQ QUESTIONS 1-9: 0

## 2025-07-02 ASSESSMENT — ENCOUNTER SYMPTOMS: COUGH: 1

## 2025-07-02 NOTE — PROGRESS NOTES
Weight Loss Progress Note: follow up Physician Visit      Mandy Fuentes is a 29 y.o. female  who is here for her follow up  Evaluation for the medical bariatric care.      CC: I want to be healthier      The POTS has been worse.   When she has a flare she has to eat more salt and get more in IV form    She was sent to ER recently for tachycardia and IVF did not resolve that      Weight History  Current weight 299 with no change in  and BMI is Body mass index is 50.68 kg/m².  Goal weight BMI 29,   Highest weight 299   (See weight gain time line scanned into media section of chart)        Significant Medical History    Update on sleep apnea and  CPAP 8-10    Ever had bariatric surgery: no    Pregnant or planning on becoming pregnant w/in 6 months: no     Exercise  How many days a week do you currently exercise?  0 days  Have you ever been told by a physician not to exercise?  no      MEDS  Taking appetite meds none  Hunger control: poor    Diet  How many ounces of calorie-free liquids did you consume each day?  Frequent IVF and oral fluids daily maximized oz    How many meal replacements did you take each day? 0    Did you have any problems adhering to the program?  no If yes, please explain:    How many times a week do you eat out?  0-1    SLEEP:     CPAP no  Hours 8-10    Significant Psychosocial History     Current Major Lifestyle Changes: no  Any potential unsupportive people: no            Social History  Social History     Tobacco Use    Smoking status: Never     Passive exposure: Past    Smokeless tobacco: Former   Substance Use Topics    Alcohol use: Not Currently     Comment: occasionally every 6 month  - glass of wine       Do you drink any EtOH?  no   If so, how much?        Do you have upcoming any travel in the next 6 weeks?  no   If so, what do you have planned?      Behavior therapy   none        Objective  /85 (BP Site: Left Upper Arm, Patient Position: Sitting, BP Cuff Size: Large Adult)

## 2025-07-02 NOTE — PROGRESS NOTES
Identified pt with two pt identifiers (name and ). Reviewed chart in preparation for visit and have obtained necessary documentation.    Mandy Fuentes is a 29 y.o. female  Chief Complaint   Patient presents with    Weight Management     1 month/ [facility]      /85 (BP Site: Left Upper Arm, Patient Position: Sitting, BP Cuff Size: Large Adult)   Pulse 100   Temp 98 °F (36.7 °C) (Oral)   Resp 16   Ht 1.638 m (5' 4.5\")   Wt 136 kg (299 lb 14.4 oz)   LMP  (LMP Unknown)   SpO2 95%   BMI 50.68 kg/m²     1. Have you been to the ER, urgent care clinic since your last visit?  Hospitalized since your last visit?yes, ER [facility]    2. Have you seen or consulted any other health care providers outside of the [facility] since your last visit?  Include any pap smears or colon screening. Cardiology- [facility]     Patient attended triage but did not bring homework form. Patient instructed to email or fax completed homework form to us. Patient informed that not bringing the homework form can result in not being seen next time.

## 2025-07-02 NOTE — TELEPHONE ENCOUNTER
Call sent from  to triage   Patient reports sinus pain, post nasal drip, ear pain and irritation in throat. No fever or chills. She has not taken covid test. Symptoms started yesterday   Scheduled to see NP this afternoon

## 2025-07-02 NOTE — PROGRESS NOTES
Mandy Fuentes (:  1995) is a 29 y.o. female,here for evaluation of the following chief complaint(s):  Cold Symptoms    BP (!) 129/91   Pulse (!) 124   Temp 97.3 °F (36.3 °C)   Resp 15   Ht 1.638 m (5' 4.5\")   Wt 135.8 kg (299 lb 6.4 oz)   LMP  (LMP Unknown)   SpO2 96%   BMI 50.60 kg/m²       SUBJECTIVE/OBJECTIVE:    HPI:Patient reports cough and congestion since yesterday. She does have POTS. She took allegra and dayquil. No fever.    Review of Systems   HENT:  Positive for congestion.    Respiratory:  Positive for cough.        Physical Exam  Constitutional:       Appearance: Normal appearance.   HENT:      Head: Normocephalic.      Right Ear: Tympanic membrane, ear canal and external ear normal.      Left Ear: Tympanic membrane, ear canal and external ear normal.      Nose: Congestion present.      Mouth/Throat:      Mouth: Mucous membranes are moist.      Pharynx: Oropharynx is clear.   Cardiovascular:      Rate and Rhythm: Normal rate and regular rhythm.   Pulmonary:      Effort: Pulmonary effort is normal.      Breath sounds: Normal breath sounds.   Skin:     General: Skin is warm and dry.   Neurological:      General: No focal deficit present.      Mental Status: She is alert and oriented to person, place, and time.   Psychiatric:         Mood and Affect: Mood normal.         Behavior: Behavior normal.        Results for orders placed or performed in visit on 25   AMB POC COVID-19 & INFLUENZA A/B   Result Value Ref Range    SARS-COV-2 RNA, POC Negative     INFLUENZA A RNA, POC Not-Detected     INFLUENZA B RNA, POC Not-Detected     Internal Control      Lot number swab      EXP date swab      LOT NUMBER POC      EXPIRATION DATE      Lot number solution      EXP date solution         ASSESSMENT/PLAN:  1. Nasal congestion  -     AMB POC COVID-19 & INFLUENZA A/B  Flonase  Allegra  Nasal saline rinses    --REJI Boyle - NP

## 2025-07-03 ENCOUNTER — PATIENT MESSAGE (OUTPATIENT)
Age: 30
End: 2025-07-03

## 2025-07-03 NOTE — TELEPHONE ENCOUNTER
Seen by NP. Nose stuffiness, yellow/green drainage. Ear clogging. Dry cough. No fever. Headaches. Tried Allegra, Saline rinse, nasal spray. Not helping with sx. Have autoimmune disease and requesting antibiotic.

## 2025-07-06 DIAGNOSIS — E66.813 CLASS 3 SEVERE OBESITY DUE TO EXCESS CALORIES WITH SERIOUS COMORBIDITY AND BODY MASS INDEX (BMI) OF 50.0 TO 59.9 IN ADULT (HCC): ICD-10-CM

## 2025-07-06 DIAGNOSIS — Z13.220 SCREENING FOR HYPERCHOLESTEROLEMIA: ICD-10-CM

## 2025-07-06 DIAGNOSIS — Z13.1 SCREENING FOR DIABETES MELLITUS: ICD-10-CM

## 2025-07-06 DIAGNOSIS — Z13.29 SCREENING FOR HYPOTHYROIDISM: ICD-10-CM

## 2025-07-07 ENCOUNTER — CLINICAL DOCUMENTATION (OUTPATIENT)
Age: 30
End: 2025-07-07

## 2025-07-07 NOTE — PROGRESS NOTES
Prior Authorization form and office note for (Saxenda, 18 MG/3 ML )[facility]. Awaiting determination.

## 2025-07-23 ENCOUNTER — TELEMEDICINE (OUTPATIENT)
Age: 30
End: 2025-07-23

## 2025-07-23 DIAGNOSIS — E66.813 CLASS 3 SEVERE OBESITY DUE TO EXCESS CALORIES WITH SERIOUS COMORBIDITY AND BODY MASS INDEX (BMI) OF 50.0 TO 59.9 IN ADULT (HCC): Primary | ICD-10-CM

## 2025-07-23 NOTE — PROGRESS NOTES
[facility]  [address], [address]  [place] [address]  Phone: [phone] Fax: [phone]   Nutrition Assessment - Medical Nutrition Therapy   Initial Evaluation         Patient Name: Mandy Fuentes : 1995   Treatment Diagnosis: Obesity Class 3   Referral Source: Thuy Taylor MD Start of Care (SOC): 2025     In time:   11:31am         Out time:   12:15pm   Total Treatment Time (min):   44 min     Consent:  Patient and/or their healthcare decision maker is aware that this patient-initiated Telehealth encounter is a billable service, with coverage as determined by her insurance carrier. They are aware that they may receive a bill and has provided verbal consent to proceed: yes, confirmed    Mandy Fuentes was evaluated through a synchronous (real-time) audio encounter. Patient identification was verified at the start of the visit. She (or guardian if applicable) is aware that this is a billable service, which includes applicable co-pays. This visit was conducted with the patient's (and/or legal guardian's) verbal consent.   The patient was located at Home: [address] [place] [address].  The provider was located at Home (not Appt Dept State): [place]  Confirm you are appropriately licensed, registered, or certified to deliver care in the state where the patient is located as indicated above. If you are not or unsure, please re-schedule the visit: Yes, I confirm.     JUNE DIEZ RD      Gender: female Age: 29 y.o.   Ht: 64 in Wt:  299 lb  kg   BMI: 50 RMR   Male  RMR Female    Anthropometrics Assessment: Per BMI, pt is considered obese.      Past Medical History includes: POTS, Crohns, GERD, PCOS, ADHD, Depression, Anxiety     Pertinent Medications:   Wegovy started, but higher dose interfered with POTS. Stopped it.  Saxenda tried, which made her get dehydrated.  Kept taking it, felt sick, and passed out on 25. Stopped it.

## 2025-08-15 ENCOUNTER — TELEMEDICINE (OUTPATIENT)
Age: 30
End: 2025-08-15
Payer: MEDICAID

## 2025-08-15 VITALS — HEIGHT: 65 IN | BODY MASS INDEX: 48.82 KG/M2 | WEIGHT: 293 LBS

## 2025-08-15 DIAGNOSIS — R94.5 ABNORMAL LIVER FUNCTION: ICD-10-CM

## 2025-08-15 DIAGNOSIS — E66.813 CLASS 3 SEVERE OBESITY DUE TO EXCESS CALORIES WITH SERIOUS COMORBIDITY AND BODY MASS INDEX (BMI) OF 50.0 TO 59.9 IN ADULT (HCC): Primary | ICD-10-CM

## 2025-08-15 DIAGNOSIS — G93.2 IIH (IDIOPATHIC INTRACRANIAL HYPERTENSION): ICD-10-CM

## 2025-08-15 DIAGNOSIS — G90.A POTS (POSTURAL ORTHOSTATIC TACHYCARDIA SYNDROME): ICD-10-CM

## 2025-08-15 DIAGNOSIS — E28.2 PCOS (POLYCYSTIC OVARIAN SYNDROME): ICD-10-CM

## 2025-08-15 PROCEDURE — 99213 OFFICE O/P EST LOW 20 MIN: CPT | Performed by: FAMILY MEDICINE

## 2025-08-15 ASSESSMENT — PATIENT HEALTH QUESTIONNAIRE - PHQ9
SUM OF ALL RESPONSES TO PHQ QUESTIONS 1-9: 0
8. MOVING OR SPEAKING SO SLOWLY THAT OTHER PEOPLE COULD HAVE NOTICED. OR THE OPPOSITE, BEING SO FIGETY OR RESTLESS THAT YOU HAVE BEEN MOVING AROUND A LOT MORE THAN USUAL: NOT AT ALL
1. LITTLE INTEREST OR PLEASURE IN DOING THINGS: NOT AT ALL
7. TROUBLE CONCENTRATING ON THINGS, SUCH AS READING THE NEWSPAPER OR WATCHING TELEVISION: NOT AT ALL
SUM OF ALL RESPONSES TO PHQ QUESTIONS 1-9: 0
4. FEELING TIRED OR HAVING LITTLE ENERGY: NOT AT ALL
9. THOUGHTS THAT YOU WOULD BE BETTER OFF DEAD, OR OF HURTING YOURSELF: NOT AT ALL
5. POOR APPETITE OR OVEREATING: NOT AT ALL
2. FEELING DOWN, DEPRESSED OR HOPELESS: NOT AT ALL
6. FEELING BAD ABOUT YOURSELF - OR THAT YOU ARE A FAILURE OR HAVE LET YOURSELF OR YOUR FAMILY DOWN: NOT AT ALL
3. TROUBLE FALLING OR STAYING ASLEEP: NOT AT ALL
SUM OF ALL RESPONSES TO PHQ QUESTIONS 1-9: 0
SUM OF ALL RESPONSES TO PHQ QUESTIONS 1-9: 0

## 2025-08-26 ENCOUNTER — TELEMEDICINE (OUTPATIENT)
Age: 30
End: 2025-08-26

## 2025-08-26 DIAGNOSIS — E66.813 CLASS 3 SEVERE OBESITY DUE TO EXCESS CALORIES WITH SERIOUS COMORBIDITY AND BODY MASS INDEX (BMI) OF 50.0 TO 59.9 IN ADULT (HCC): Primary | ICD-10-CM
